# Patient Record
Sex: MALE | Race: WHITE | NOT HISPANIC OR LATINO | Employment: STUDENT
[De-identification: names, ages, dates, MRNs, and addresses within clinical notes are randomized per-mention and may not be internally consistent; named-entity substitution may affect disease eponyms.]

---

## 2017-02-24 ENCOUNTER — RECORDS - HEALTHEAST (OUTPATIENT)
Dept: ADMINISTRATIVE | Facility: OTHER | Age: 22
End: 2017-02-24

## 2017-05-31 ENCOUNTER — RECORDS - HEALTHEAST (OUTPATIENT)
Dept: ADMINISTRATIVE | Facility: OTHER | Age: 22
End: 2017-05-31

## 2017-12-04 ENCOUNTER — RECORDS - HEALTHEAST (OUTPATIENT)
Dept: ADMINISTRATIVE | Facility: OTHER | Age: 22
End: 2017-12-04

## 2017-12-05 ENCOUNTER — RECORDS - HEALTHEAST (OUTPATIENT)
Dept: ADMINISTRATIVE | Facility: OTHER | Age: 22
End: 2017-12-05

## 2017-12-12 ENCOUNTER — RECORDS - HEALTHEAST (OUTPATIENT)
Dept: ADMINISTRATIVE | Facility: OTHER | Age: 22
End: 2017-12-12

## 2017-12-29 ENCOUNTER — RECORDS - HEALTHEAST (OUTPATIENT)
Dept: ADMINISTRATIVE | Facility: OTHER | Age: 22
End: 2017-12-29

## 2018-01-31 ENCOUNTER — RECORDS - HEALTHEAST (OUTPATIENT)
Dept: ADMINISTRATIVE | Facility: OTHER | Age: 23
End: 2018-01-31

## 2018-02-01 ENCOUNTER — RECORDS - HEALTHEAST (OUTPATIENT)
Dept: ADMINISTRATIVE | Facility: OTHER | Age: 23
End: 2018-02-01

## 2018-08-07 ENCOUNTER — RECORDS - HEALTHEAST (OUTPATIENT)
Dept: ADMINISTRATIVE | Facility: OTHER | Age: 23
End: 2018-08-07

## 2018-08-23 ENCOUNTER — RECORDS - HEALTHEAST (OUTPATIENT)
Dept: ADMINISTRATIVE | Facility: OTHER | Age: 23
End: 2018-08-23

## 2018-11-09 ENCOUNTER — RECORDS - HEALTHEAST (OUTPATIENT)
Dept: ADMINISTRATIVE | Facility: OTHER | Age: 23
End: 2018-11-09

## 2019-01-17 ENCOUNTER — RECORDS - HEALTHEAST (OUTPATIENT)
Dept: ADMINISTRATIVE | Facility: OTHER | Age: 24
End: 2019-01-17

## 2019-04-16 ENCOUNTER — RECORDS - HEALTHEAST (OUTPATIENT)
Dept: ADMINISTRATIVE | Facility: OTHER | Age: 24
End: 2019-04-16

## 2019-04-30 ENCOUNTER — RECORDS - HEALTHEAST (OUTPATIENT)
Dept: ADMINISTRATIVE | Facility: OTHER | Age: 24
End: 2019-04-30

## 2019-06-04 ENCOUNTER — RECORDS - HEALTHEAST (OUTPATIENT)
Dept: ADMINISTRATIVE | Facility: OTHER | Age: 24
End: 2019-06-04

## 2019-07-18 ENCOUNTER — OFFICE VISIT - HEALTHEAST (OUTPATIENT)
Dept: FAMILY MEDICINE | Facility: CLINIC | Age: 24
End: 2019-07-18

## 2019-07-18 DIAGNOSIS — M25.562 LEFT KNEE PAIN, UNSPECIFIED CHRONICITY: ICD-10-CM

## 2019-07-18 DIAGNOSIS — F33.1 MODERATE EPISODE OF RECURRENT MAJOR DEPRESSIVE DISORDER (H): ICD-10-CM

## 2019-07-18 DIAGNOSIS — F90.0 ATTENTION DEFICIT HYPERACTIVITY DISORDER (ADHD), PREDOMINANTLY INATTENTIVE TYPE: ICD-10-CM

## 2019-07-18 DIAGNOSIS — F41.9 ANXIETY: ICD-10-CM

## 2019-07-18 ASSESSMENT — MIFFLIN-ST. JEOR: SCORE: 1622.91

## 2019-07-24 ENCOUNTER — RECORDS - HEALTHEAST (OUTPATIENT)
Dept: ADMINISTRATIVE | Facility: OTHER | Age: 24
End: 2019-07-24

## 2019-07-29 ENCOUNTER — COMMUNICATION - HEALTHEAST (OUTPATIENT)
Dept: SCHEDULING | Facility: CLINIC | Age: 24
End: 2019-07-29

## 2019-07-29 DIAGNOSIS — F90.0 ATTENTION DEFICIT HYPERACTIVITY DISORDER (ADHD), PREDOMINANTLY INATTENTIVE TYPE: ICD-10-CM

## 2019-08-08 ENCOUNTER — HOSPITAL ENCOUNTER (OUTPATIENT)
Dept: MRI IMAGING | Facility: HOSPITAL | Age: 24
Discharge: HOME | End: 2019-08-08
Attending: FAMILY MEDICINE

## 2019-08-08 DIAGNOSIS — M25.562 LEFT KNEE PAIN, UNSPECIFIED CHRONICITY: ICD-10-CM

## 2019-08-12 ENCOUNTER — OFFICE VISIT - HEALTHEAST (OUTPATIENT)
Dept: FAMILY MEDICINE | Facility: CLINIC | Age: 24
End: 2019-08-12

## 2019-08-12 DIAGNOSIS — M25.562 LEFT KNEE PAIN, UNSPECIFIED CHRONICITY: ICD-10-CM

## 2019-08-12 DIAGNOSIS — F90.0 ATTENTION DEFICIT HYPERACTIVITY DISORDER (ADHD), PREDOMINANTLY INATTENTIVE TYPE: ICD-10-CM

## 2019-08-12 DIAGNOSIS — F41.9 ANXIETY: ICD-10-CM

## 2019-08-12 DIAGNOSIS — F33.1 MODERATE EPISODE OF RECURRENT MAJOR DEPRESSIVE DISORDER (H): ICD-10-CM

## 2019-08-12 ASSESSMENT — MIFFLIN-ST. JEOR: SCORE: 1625.74

## 2019-08-30 ENCOUNTER — RECORDS - HEALTHEAST (OUTPATIENT)
Dept: ADMINISTRATIVE | Facility: OTHER | Age: 24
End: 2019-08-30

## 2019-09-24 ENCOUNTER — COMMUNICATION - HEALTHEAST (OUTPATIENT)
Dept: FAMILY MEDICINE | Facility: CLINIC | Age: 24
End: 2019-09-24

## 2019-09-24 DIAGNOSIS — F90.0 ATTENTION DEFICIT HYPERACTIVITY DISORDER (ADHD), PREDOMINANTLY INATTENTIVE TYPE: ICD-10-CM

## 2019-10-22 ENCOUNTER — OFFICE VISIT - HEALTHEAST (OUTPATIENT)
Dept: FAMILY MEDICINE | Facility: CLINIC | Age: 24
End: 2019-10-22

## 2019-10-22 DIAGNOSIS — G47.00 INSOMNIA, UNSPECIFIED TYPE: ICD-10-CM

## 2019-10-22 DIAGNOSIS — F33.1 MODERATE EPISODE OF RECURRENT MAJOR DEPRESSIVE DISORDER (H): ICD-10-CM

## 2019-10-22 DIAGNOSIS — F41.9 ANXIETY: ICD-10-CM

## 2019-10-22 DIAGNOSIS — F90.0 ATTENTION DEFICIT HYPERACTIVITY DISORDER (ADHD), PREDOMINANTLY INATTENTIVE TYPE: ICD-10-CM

## 2019-10-22 ASSESSMENT — MIFFLIN-ST. JEOR: SCORE: 1616.39

## 2019-10-23 ASSESSMENT — ANXIETY QUESTIONNAIRES
1. FEELING NERVOUS, ANXIOUS, OR ON EDGE: NEARLY EVERY DAY
6. BECOMING EASILY ANNOYED OR IRRITABLE: SEVERAL DAYS
4. TROUBLE RELAXING: MORE THAN HALF THE DAYS
GAD7 TOTAL SCORE: 13
3. WORRYING TOO MUCH ABOUT DIFFERENT THINGS: NEARLY EVERY DAY
7. FEELING AFRAID AS IF SOMETHING AWFUL MIGHT HAPPEN: NOT AT ALL
5. BEING SO RESTLESS THAT IT IS HARD TO SIT STILL: SEVERAL DAYS
2. NOT BEING ABLE TO STOP OR CONTROL WORRYING: NEARLY EVERY DAY

## 2019-10-23 ASSESSMENT — PATIENT HEALTH QUESTIONNAIRE - PHQ9: SUM OF ALL RESPONSES TO PHQ QUESTIONS 1-9: 14

## 2019-11-27 ENCOUNTER — OFFICE VISIT - HEALTHEAST (OUTPATIENT)
Dept: FAMILY MEDICINE | Facility: CLINIC | Age: 24
End: 2019-11-27

## 2019-11-27 DIAGNOSIS — F33.1 MODERATE EPISODE OF RECURRENT MAJOR DEPRESSIVE DISORDER (H): ICD-10-CM

## 2019-11-27 DIAGNOSIS — F90.0 ATTENTION DEFICIT HYPERACTIVITY DISORDER (ADHD), PREDOMINANTLY INATTENTIVE TYPE: ICD-10-CM

## 2019-11-27 DIAGNOSIS — F41.9 ANXIETY: ICD-10-CM

## 2019-11-27 DIAGNOSIS — F12.10 CANNABIS ABUSE: ICD-10-CM

## 2019-11-27 ASSESSMENT — MIFFLIN-ST. JEOR: SCORE: 1612.98

## 2019-12-23 ENCOUNTER — RECORDS - HEALTHEAST (OUTPATIENT)
Dept: ADMINISTRATIVE | Facility: OTHER | Age: 24
End: 2019-12-23

## 2019-12-26 ENCOUNTER — RECORDS - HEALTHEAST (OUTPATIENT)
Dept: FAMILY MEDICINE | Facility: CLINIC | Age: 24
End: 2019-12-26

## 2019-12-27 ENCOUNTER — AMBULATORY - HEALTHEAST (OUTPATIENT)
Dept: LAB | Facility: CLINIC | Age: 24
End: 2019-12-27

## 2019-12-27 DIAGNOSIS — Z79.899 ENCOUNTER FOR LONG-TERM (CURRENT) USE OF HIGH-RISK MEDICATION: ICD-10-CM

## 2019-12-27 LAB
AMPHETAMINES UR QL SCN: NORMAL
BARBITURATES UR QL: NORMAL
BENZODIAZ UR QL: NORMAL
CANNABINOIDS UR QL SCN: NORMAL
COCAINE UR QL: NORMAL
CREAT UR-MCNC: 10.4 MG/DL
OPIATES UR QL SCN: NORMAL
OXYCODONE UR QL: NORMAL
PCP UR QL SCN: NORMAL

## 2019-12-30 ENCOUNTER — OFFICE VISIT - HEALTHEAST (OUTPATIENT)
Dept: FAMILY MEDICINE | Facility: CLINIC | Age: 24
End: 2019-12-30

## 2019-12-30 DIAGNOSIS — J02.9 SORE THROAT: ICD-10-CM

## 2019-12-30 LAB — DEPRECATED S PYO AG THROAT QL EIA: NORMAL

## 2019-12-30 ASSESSMENT — MIFFLIN-ST. JEOR: SCORE: 1617.24

## 2019-12-31 LAB — GROUP A STREP BY PCR: NORMAL

## 2020-01-20 ENCOUNTER — RECORDS - HEALTHEAST (OUTPATIENT)
Dept: ADMINISTRATIVE | Facility: OTHER | Age: 25
End: 2020-01-20

## 2020-04-07 ENCOUNTER — RECORDS - HEALTHEAST (OUTPATIENT)
Dept: ADMINISTRATIVE | Facility: OTHER | Age: 25
End: 2020-04-07

## 2020-06-01 ENCOUNTER — RECORDS - HEALTHEAST (OUTPATIENT)
Dept: ADMINISTRATIVE | Facility: OTHER | Age: 25
End: 2020-06-01

## 2020-07-15 ENCOUNTER — RECORDS - HEALTHEAST (OUTPATIENT)
Dept: ADMINISTRATIVE | Facility: OTHER | Age: 25
End: 2020-07-15

## 2020-08-19 ENCOUNTER — RECORDS - HEALTHEAST (OUTPATIENT)
Dept: ADMINISTRATIVE | Facility: OTHER | Age: 25
End: 2020-08-19

## 2020-12-22 ENCOUNTER — OFFICE VISIT - HEALTHEAST (OUTPATIENT)
Dept: INTERNAL MEDICINE | Facility: CLINIC | Age: 25
End: 2020-12-22

## 2020-12-22 DIAGNOSIS — R04.0 RECURRENT EPISTAXIS: ICD-10-CM

## 2020-12-22 DIAGNOSIS — J30.9 CHRONIC ALLERGIC RHINITIS: ICD-10-CM

## 2020-12-30 ENCOUNTER — TELEPHONE (OUTPATIENT)
Dept: OTOLARYNGOLOGY | Facility: CLINIC | Age: 25
End: 2020-12-30

## 2020-12-30 ENCOUNTER — OFFICE VISIT (OUTPATIENT)
Dept: OTOLARYNGOLOGY | Facility: CLINIC | Age: 25
End: 2020-12-30
Payer: COMMERCIAL

## 2020-12-30 VITALS
BODY MASS INDEX: 19.88 KG/M2 | RESPIRATION RATE: 17 BRPM | HEART RATE: 69 BPM | DIASTOLIC BLOOD PRESSURE: 77 MMHG | SYSTOLIC BLOOD PRESSURE: 111 MMHG | OXYGEN SATURATION: 97 % | WEIGHT: 142 LBS | HEIGHT: 71 IN

## 2020-12-30 DIAGNOSIS — R04.0 EPISTAXIS: ICD-10-CM

## 2020-12-30 DIAGNOSIS — J34.3 NASAL TURBINATE HYPERTROPHY: ICD-10-CM

## 2020-12-30 DIAGNOSIS — J34.89 NASAL OBSTRUCTION: Primary | ICD-10-CM

## 2020-12-30 DIAGNOSIS — J34.2 NASAL SEPTAL DEVIATION: ICD-10-CM

## 2020-12-30 PROCEDURE — 99204 OFFICE O/P NEW MOD 45 MIN: CPT | Performed by: OTOLARYNGOLOGY

## 2020-12-30 RX ORDER — DEXTROAMPHETAMINE SACCHARATE, AMPHETAMINE ASPARTATE, DEXTROAMPHETAMINE SULFATE AND AMPHETAMINE SULFATE 2.5; 2.5; 2.5; 2.5 MG/1; MG/1; MG/1; MG/1
10 TABLET ORAL
COMMUNITY
End: 2023-02-27

## 2020-12-30 RX ORDER — FLUOXETINE 40 MG/1
40 CAPSULE ORAL DAILY
COMMUNITY
Start: 2020-07-14 | End: 2023-02-20

## 2020-12-30 ASSESSMENT — MIFFLIN-ST. JEOR: SCORE: 1653.3

## 2020-12-30 ASSESSMENT — PAIN SCALES - GENERAL: PAINLEVEL: NO PAIN (0)

## 2020-12-30 NOTE — LETTER
12/30/2020         RE: Boris Hartman  [address] [facility] [address]        Dear Colleague,    Thank you for referring your patient, Boris Hartman, to the [facility]. Please see a copy of my visit note below.    Chief Complaint - Epistaxis and nasal crusting    History of Present Illness - Boris Hartman is a 25 year old male presents with approximately a few months of epistaxis. It occurs on the right side. It started with some crusting, blowing out crusts, and then eventual bleeding. he has more nasal obstruction on the left. He admits to touching face a lot as he has ADHD. It usually only comes out the front of the nose. The bleeding occurs approximately every few weeks badly, but a few times a week more mild symptoms. The patient can get the bleeding to stop by pressure. The patient has never gone to the emergency department or required a blood transfusion due to nose bleeding. The patient has no personal or family history of bleeding disorders. The patient takes no blood-thinning medication. The patient has tried nothing.    Past Medical History -   - ADHD  - depression    Current Medications -   Current Outpatient Medications:      amphetamine-dextroamphetamine (ADDERALL) 10 MG tablet, Take 10 mg by mouth, Disp: , Rfl:      FLUoxetine (PROZAC) 40 MG capsule, Take 40 mg by mouth daily, Disp: , Rfl:     Allergies -   Allergies   Allergen Reactions     Sulfa Drugs Hives       Social History -   Social History     Socioeconomic History     Marital status: Single     Spouse name: None     Number of children: None     Years of education: None     Highest education level: None   Occupational History     None   Social Needs     Financial resource strain: None     Food insecurity     Worry: None     Inability: None     Transportation needs     Medical: None     Non-medical: None   Tobacco Use     Smoking status: Never Smoker     Smokeless tobacco: Current User     Types: Chew   Substance  "and Sexual Activity     Alcohol use: None     Drug use: None     Sexual activity: None   Lifestyle     Physical activity     Days per week: None     Minutes per session: None     Stress: None   Relationships     Social connections     Talks on phone: None     Gets together: None     Attends Moravian service: None     Active member of club or organization: None     Attends meetings of clubs or organizations: None     Relationship status: None     Intimate partner violence     Fear of current or ex partner: None     Emotionally abused: None     Physically abused: None     Forced sexual activity: None   Other Topics Concern     None   Social History Narrative     None       Family History - History reviewed. No pertinent family history. no bleeding disorders.     Review of Systems - As per HPI and PMHx, otherwise 10+ comprehensive system review is negative.    Physical Exam  /77   Pulse 69   Resp 17   Ht 1.791 m (5' 10.5\")   Wt 64.4 kg (142 lb)   SpO2 97%   BMI 20.09 kg/m    General - The patient is in no distress.  Alert and oriented x3, answers questions and cooperates with examination appropriately.   Voice and Breathing - The patient was breathing comfortably without the use of accessory muscles. There was no wheezing, stridor, or stertor.  The patients voice was clear and strong, with no dysphonia.  Head and Face - Normocephalic and atraumatic.    Eyes - Extraocular movements intact. Sclera were not icteric or injected, conjunctiva were pink and moist.  Neurologic - Cranial nerves II-XII are grossly intact. Specifically, the facial nerve is intact, House-Brackmann grade 1 of 6.   Nose - No significant external deformity. The septum was deviated severely to the left, completely obstructing, turbinates are hypertrophic.  Right mid septum has a small granuloma or hemangioma, 1 cm. No easy bleeding with manipulation.  Mouth - Examination of the oral cavity showed pink, healthy oral mucosa. No lesions or " ulcerations noted.  The tongue was mobile and protrudes midline.   Oropharynx - The walls of the oropharynx were smooth, symmetric, and had no lesions or ulcerations. The uvula was midline and the palate raised symmetrically. No blood.  Neck -  Soft, non-tender. Palpation of the occipital, submental, submandibular, internal jugular chain, and supraclavicular nodes did not demonstrate any abnormal lymph nodes or masses. The parotid glands were without masses. Palpation of the thyroid was soft and smooth, with no nodules or goiter appreciated.  The trachea was midline.  Cardiovascular - carotid pulses are 2+ bilaterally, regular rhythm        A/P - Boris Hartman is a 25 year old male with epistaxis. This is almost certainly coming from the right mid septum. He has a granuloma or hemangioma at this site. I offered removal, but he also has septal deviation and turbinate hypertrophy causing nasal obstruction. He prefers to undergo septoplasty and bilateral inferior turbinate reduction. Therefore, I will remove the right lesion at the time of surgery.     I counseled the patient on the risks of surgery, including infection, bleeding, risks of general anesthesia, the risk of failure of the surgery to relieve nasal obstruction, the possible need for additional procedures, the possible need for additional medical therapy, and the possibility of alteration of the appearance of the external nose.  They understood and wished to proceed to scheduling.      Trev Gu MD  Otolaryngology  [facility]        Again, thank you for allowing me to participate in the care of your patient.        Sincerely,        Trev Gu MD

## 2020-12-30 NOTE — PROGRESS NOTES
Chief Complaint - Epistaxis and nasal crusting    History of Present Illness - Boris Hartman is a 25 year old male presents with approximately a few months of epistaxis. It occurs on the right side. It started with some crusting, blowing out crusts, and then eventual bleeding. he has more nasal obstruction on the left. He admits to touching face a lot as he has ADHD. It usually only comes out the front of the nose. The bleeding occurs approximately every few weeks badly, but a few times a week more mild symptoms. The patient can get the bleeding to stop by pressure. The patient has never gone to the emergency department or required a blood transfusion due to nose bleeding. The patient has no personal or family history of bleeding disorders. The patient takes no blood-thinning medication. The patient has tried nothing.    Past Medical History -   - ADHD  - depression    Current Medications -   Current Outpatient Medications:      amphetamine-dextroamphetamine (ADDERALL) 10 MG tablet, Take 10 mg by mouth, Disp: , Rfl:      FLUoxetine (PROZAC) 40 MG capsule, Take 40 mg by mouth daily, Disp: , Rfl:     Allergies -   Allergies   Allergen Reactions     Sulfa Drugs Hives       Social History -   Social History     Socioeconomic History     Marital status: Single     Spouse name: None     Number of children: None     Years of education: None     Highest education level: None   Occupational History     None   Social Needs     Financial resource strain: None     Food insecurity     Worry: None     Inability: None     Transportation needs     Medical: None     Non-medical: None   Tobacco Use     Smoking status: Never Smoker     Smokeless tobacco: Current User     Types: Chew   Substance and Sexual Activity     Alcohol use: None     Drug use: None     Sexual activity: None   Lifestyle     Physical activity     Days per week: None     Minutes per session: None     Stress: None   Relationships     Social connections     Talks on  "phone: None     Gets together: None     Attends Judaism service: None     Active member of club or organization: None     Attends meetings of clubs or organizations: None     Relationship status: None     Intimate partner violence     Fear of current or ex partner: None     Emotionally abused: None     Physically abused: None     Forced sexual activity: None   Other Topics Concern     None   Social History Narrative     None       Family History - History reviewed. No pertinent family history. no bleeding disorders.     Review of Systems - As per HPI and PMHx, otherwise 10+ comprehensive system review is negative.    Physical Exam  /77   Pulse 69   Resp 17   Ht 1.791 m (5' 10.5\")   Wt 64.4 kg (142 lb)   SpO2 97%   BMI 20.09 kg/m    General - The patient is in no distress.  Alert and oriented x3, answers questions and cooperates with examination appropriately.   Voice and Breathing - The patient was breathing comfortably without the use of accessory muscles. There was no wheezing, stridor, or stertor.  The patients voice was clear and strong, with no dysphonia.  Head and Face - Normocephalic and atraumatic.    Eyes - Extraocular movements intact. Sclera were not icteric or injected, conjunctiva were pink and moist.  Neurologic - Cranial nerves II-XII are grossly intact. Specifically, the facial nerve is intact, House-Brackmann grade 1 of 6.   Nose - No significant external deformity. The septum was deviated severely to the left, completely obstructing, turbinates are hypertrophic.  Right mid septum has a small granuloma or hemangioma, 1 cm. No easy bleeding with manipulation.  Mouth - Examination of the oral cavity showed pink, healthy oral mucosa. No lesions or ulcerations noted.  The tongue was mobile and protrudes midline.   Oropharynx - The walls of the oropharynx were smooth, symmetric, and had no lesions or ulcerations. The uvula was midline and the palate raised symmetrically. No blood.  Neck -  " Soft, non-tender. Palpation of the occipital, submental, submandibular, internal jugular chain, and supraclavicular nodes did not demonstrate any abnormal lymph nodes or masses. The parotid glands were without masses. Palpation of the thyroid was soft and smooth, with no nodules or goiter appreciated.  The trachea was midline.  Cardiovascular - carotid pulses are 2+ bilaterally, regular rhythm        A/P - Boris Hartman is a 25 year old male with epistaxis. This is almost certainly coming from the right mid septum. He has a granuloma or hemangioma at this site. I offered removal, but he also has septal deviation and turbinate hypertrophy causing nasal obstruction. He prefers to undergo septoplasty and bilateral inferior turbinate reduction. Therefore, I will remove the right lesion at the time of surgery.     I counseled the patient on the risks of surgery, including infection, bleeding, risks of general anesthesia, the risk of failure of the surgery to relieve nasal obstruction, the possible need for additional procedures, the possible need for additional medical therapy, and the possibility of alteration of the appearance of the external nose.  They understood and wished to proceed to scheduling.      Trev Gu MD  Otolaryngology  [facility]

## 2021-01-06 ENCOUNTER — RECORDS - HEALTHEAST (OUTPATIENT)
Dept: ADMINISTRATIVE | Facility: OTHER | Age: 26
End: 2021-01-06

## 2021-05-12 ENCOUNTER — RECORDS - HEALTHEAST (OUTPATIENT)
Dept: ADMINISTRATIVE | Facility: OTHER | Age: 26
End: 2021-05-12

## 2021-05-26 ASSESSMENT — PATIENT HEALTH QUESTIONNAIRE - PHQ9: SUM OF ALL RESPONSES TO PHQ QUESTIONS 1-9: 14

## 2021-05-28 ASSESSMENT — ANXIETY QUESTIONNAIRES: GAD7 TOTAL SCORE: 13

## 2021-05-30 NOTE — PROGRESS NOTES
Assessment / Impression     1. Attention deficit hyperactivity disorder (ADHD), predominantly inattentive type  lisdexamfetamine (VYVANSE) 20 MG capsule   2. Anxiety     3. Moderate episode of recurrent major depressive disorder (H)     4. Left knee pain, unspecified chronicity  XR Knee Left Plus Sunrise VW    MR Knee Without Contrast Left       The following are part of a depression follow up plan for the patient:  mental health care assessment    Plan:     We discussed his symptoms which appear to be primarily due to worsening ADHD symptoms.  Since he has not noticed benefit from the Lexapro or Zoloft I recommended he stay off SSRIs for now.  We discussed treatment options to target the attention deficit issues more directly and decided to start Vyvanse 20 mg daily.  We discussed potential side effects which he is aware of he has been on methylphenidate in the past.  We will continue to monitor his anxiety depression symptoms.  I asked him to return to the clinic in 1 month for follow-up and we can make adjustments if needed time.  We also discussed strategies to help with staying organized at school.  He would particularly benefit from using a calendar to keep up with tasks, homework, meetings and class projects.  He is already getting accommodations through school so that he can have more time to take exams and complete projects.    I recommended that we check an x-ray of the left knee to further evaluate his pain symptoms.  I personally reviewed the images and provided an initial interpretation which show no acute fractures or abnormalities.  The formal radiology report is pending.  I recommended that we check an MRI to further evaluate the symptoms since he is majoring in dance.    Subjective:      HPI: Boris Hartman is a 24 y.o. male who presents to the clinic with his mother to tablets care and to discuss worsening attention deficit issues, anxiety and depression symptoms.  They report that he was formally  diagnosed with ADHD, primarily inattentive type in the second and third grade.  He did not start medication until he was in the eighth grade.  He was primarily taking methylphenidate through high school which worked well.  He was a successful student and primarily made A's and B's.  He reports that his anxiety depression symptoms were present at that time, but were not as overwhelming as they are now.  He is currently starting his fourth year college at [employer].  He anticipates needing 5 years to graduate.  When he was a freshman in college his anxiety spiked.  His PCP at that time decided to stop the methylphenidate thinking this was contributing to the increased anxiety started on Lexapro.  This provided some benefit regarding anxiety depression symptoms, but he ended up switching to Zoloft this past April because of the anxiety and depression symptoms were getting worse.  He reports that his schoolwork has suffered tremendously the past 2 years.  He describes having difficulty staying organized.  He is struggling to focus on schoolwork.  His motivation is down.  He is procrastinating and not getting work done on time.  His grades have suffered.  This is because increased anxiety and depression symptoms for him.  He reports that he is able to focus on things that interest him which he finds calming.  This is primarily media.    He is also concerned today about pain in the left knee that started last fall.  He does not remember specific injury that may have caused this.  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  He also reports that it is very important that he is knee remain healthy to keep up with the requirements of his major.  He denies knee swelling, catching or locking symptoms.  He denies having history of surgery or fractures that involve the left knee.  He reports that the knee feels on stable.  Symptoms are worse when the knee is fully flexed.        Review of  "Systems  All other systems reviewed and are negative.     Social History     Tobacco Use   Smoking Status Never Smoker   Smokeless Tobacco Never Used       No family history on file.    Objective:     /66 (Patient Site: Left Arm, Patient Position: Sitting, Cuff Size: Adult Regular)   Pulse (!) 56   Temp 98.7  F (37.1  C) (Oral)   Resp 16   Ht 5' 11.3\" (1.811 m)   Wt 132 lb 8 oz (60.1 kg)   BMI 18.32 kg/m    Physical Examination: General appearance - alert, well appearing, and in no distress  Eyes: pupils equal and reactive, extraocular eye movements intact  Mouth: mucous membranes moist, pharynx normal without lesions  Neck: supple, no significant adenopathy  Lungs: clear to auscultation, no wheezes, rales or rhonchi, symmetric air entry  Heart: normal rate, regular rhythm, normal S1, S2, no murmurs, rubs, clicks or gallops  Neurological: alert, oriented, normal speech, no focal findings or movement disorder noted.    Extremities: No edema, no clubbing or cyanosis.  Knee flexion and extension is normal bilaterally.  Full knee flexion on the left causes slight discomfort.  There are no effusions in the knees.  Arelis's, varus, valgus, Lachman's and drawer testing negative bilaterally.  Psychiatric: He appears moderately anxious during the exam.  He answers questions appropriately.  He does not appear depressed.    No results found for this or any previous visit (from the past 168 hour(s)).    Current Outpatient Medications   Medication Sig     lisdexamfetamine (VYVANSE) 20 MG capsule Take 1 capsule (20 mg total) by mouth daily.     "

## 2021-05-30 NOTE — TELEPHONE ENCOUNTER
I spoke to his mother.  He was not available because he was at work.  She reports that he has been feeling more anxious primarily because he is having difficulty with focus and organization.  I recommended increasing the Vyvanse to 40 mg daily to see if this helps.  It sounds like he has been sporadically taking the sertraline this past week.  He is going to hold this for now and see how things go on the higher dose of Vyvanse before we commit to restarting an SSRI.  He has a follow-up appointment with me on 8/12/2019. -DE

## 2021-05-31 NOTE — PROGRESS NOTES
Assessment / Impression     1. Attention deficit hyperactivity disorder (ADHD), predominantly inattentive type  lisdexamfetamine (VYVANSE) 40 MG capsule   2. Moderate episode of recurrent major depressive disorder (H)     3. Anxiety     4. Left knee pain, unspecified chronicity         The following are part of a depression follow up plan for the patient:  mental health care assessment    Plan:     He appears to be doing better on Vyvanse 40 mg daily.  I recommended he continue this.  He will continue to monitor symptoms closely as he is about to start his next year of college.  Asked him to follow-up with me over the Thanksgiving or winter break to see how things are going.    I reviewed with him the normal MRI results of his left knee.  His pain symptoms are likely to to an overuse injury, possibly IT band syndrome since his tenderness is more localized to the left lateral knee today.  We discussed relative rest and improving core muscle strength.  If symptoms continue he will let me know and we can pursue further work-up if needed.    Subjective:      HPI: Boris Hartman is a 24 y.o. male who presents to the clinic for follow-up.  He was last seen on 7/18/2019 for worsening ADHD symptoms.  He is also having quite a bit of anxiety and depression symptoms which were felt at the time to be due mostly to difficulty with focus and organization.  He took Vyvanse 20 mg daily and on 7/29/2019 we decided to increase the dose to 40 mg daily since he was having increased anxiety which was felt to be related to difficulty with focus and organization.  He feels like the higher dose has been helpful.  He denies having unwanted side effects.  Sleep been appetite have been fine.  He will be starting his next year of college at [employer] in a few weeks.  He continues to have some mild pain in the left knee.  He puts his hand over the lateral left knee when explaining with the symptoms are located.  Symptoms are worse with overuse  "and when the knee is fully flexed.  He recently had an MRI of the left knee which was normal.  He denies swelling.  He denies any specific injury.  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       Review of Systems  All other systems reviewed and are negative.     Social History     Tobacco Use   Smoking Status Never Smoker   Smokeless Tobacco Never Used       No family history on file.    Objective:     /60 (Patient Site: Right Arm, Patient Position: Sitting, Cuff Size: Adult Regular)   Pulse (!) 56   Temp 98.3  F (36.8  C) (Oral)   Resp 16   Ht 5' 11.3\" (1.811 m)   Wt 133 lb 2 oz (60.4 kg)   BMI 18.41 kg/m    Physical Examination: General appearance - alert, well appearing, and in no distress  Eyes: pupils equal and reactive, extraocular eye movements intact  Mouth: mucous membranes moist, pharynx normal without lesions  Neck: supple, no significant adenopathy  Lungs: clear to auscultation, no wheezes, rales or rhonchi, symmetric air entry  Heart: normal rate, regular rhythm, normal S1, S2, no murmurs, rubs, clicks or gallops  Neurological: alert, oriented, normal speech, no focal findings or movement disorder noted.    Extremities: No edema, no clubbing or cyanosis.  Effusions in the knee.  He is mildly tender over the left lateral knee in the area of the left iliotibial band.  Arelis's, varus, valgus, Lachman's and drawer testing negative bilaterally.  Internal and external hip rotation normal bilaterally.  Psychiatric: Normal affect. Does not appear anxious or depressed.    No results found for this or any previous visit (from the past 168 hour(s)).    Current Outpatient Medications   Medication Sig     lisdexamfetamine (VYVANSE) 40 MG capsule Take 1 capsule (40 mg total) by mouth daily.     "

## 2021-06-01 NOTE — TELEPHONE ENCOUNTER
Controlled Substance Refill Request  Medication:   Requested Prescriptions     Pending Prescriptions Disp Refills     lisdexamfetamine (VYVANSE) 40 MG capsule 30 capsule 0     Sig: Take 1 capsule (40 mg total) by mouth daily.     Date Last Fill: 8/12/19  Pharmacy: [id number]   Submit electronically to pharmacy  Controlled Substance Agreement on File:   Encounter-Level CSA Scan Date:    There are no encounter-level csa scan date.       Last office visit: Last office visit pertaining to requested medication was 8/12/19.

## 2021-06-01 NOTE — TELEPHONE ENCOUNTER
Patient Returning Call  Reason for call:  Return call  Information relayed to patient:  n/a  Patient has additional questions:  Yes  If YES, what are your questions/concerns:  Patient is returning a call. Please call the patient back.  Okay to leave a detailed message?: Yes   [phone]

## 2021-06-02 NOTE — PROGRESS NOTES
"Assessment / Impression     1. Attention deficit hyperactivity disorder (ADHD), predominantly inattentive type  lisdexamfetamine (VYVANSE) 50 MG capsule   2. Moderate episode of recurrent major depressive disorder (H)     3. Anxiety     4. Insomnia, unspecified type         The following are part of a depression follow up plan for the patient:  mental health care assessment    Plan:     We decided to increase the Vyvanse to 50 mg daily.  I encouraged him to work on getting more sleep.  We discussed sleep hygiene strategies and he may try over-the-counter melatonin as needed.  His anxiety and depression symptoms appear to be stable without medication.  He may follow-up in 6 months or sooner if needed.    Subjective:      HPI: Boris Hartman is a 24 y.o. male who has a history of ADHD inattentive type, depression, anxiety and insomnia presents to the clinic for follow-up.  He just finished [employer].  He feels like things been going much better this semester with focus and distractibility, but there is still room for improvement especially in terms of staying well organized.  He is wondering if a slight increase in the dose would be beneficial.  He denies having unwanted side effects from the medication.  However, he continues to struggle with having difficulty falling asleep.  This been a problem for him for years.  He typically does not fall asleep until 2 AM.  He still able to wake up for his morning classes.  He has never tried any medication for this.  At one point somebody prescribed trazodone, but he never tried it.        Review of Systems  All other systems reviewed and are negative.     Social History     Tobacco Use   Smoking Status Never Smoker   Smokeless Tobacco Never Used       No family history on file.    Objective:     /72 (Patient Site: Left Arm, Patient Position: Sitting, Cuff Size: Adult Regular)   Pulse 84   Temp 98.6  F (37  C) (Oral)   Resp 20   Ht 5' 11.3\" " (1.811 m)   Wt 131 lb 1 oz (59.4 kg)   BMI 18.13 kg/m    Physical Examination: General appearance - alert, well appearing, and in no distress  Eyes: pupils equal and reactive, extraocular eye movements intact  Mouth: mucous membranes moist, pharynx normal without lesions  Neck: supple, no significant adenopathy  Lungs: clear to auscultation, no wheezes, rales or rhonchi, symmetric air entry  Heart: normal rate, regular rhythm, normal S1, S2, no murmurs, rubs, clicks or gallops  Neurological: alert, oriented, normal speech, no focal findings or movement disorder noted.    Extremities: No edema, no clubbing or cyanosis  Psychiatric: Normal affect. Does not appear anxious or depressed.  PHQ-9 score is 14.  ADELINE-7 score is 13.    No results found for this or any previous visit (from the past 168 hour(s)).    Current Outpatient Medications   Medication Sig     lisdexamfetamine (VYVANSE) 50 MG capsule Take 1 capsule (50 mg total) by mouth daily.

## 2021-06-03 VITALS
SYSTOLIC BLOOD PRESSURE: 112 MMHG | WEIGHT: 131.25 LBS | HEART RATE: 80 BPM | TEMPERATURE: 97.8 F | RESPIRATION RATE: 16 BRPM | BODY MASS INDEX: 18.37 KG/M2 | DIASTOLIC BLOOD PRESSURE: 74 MMHG | HEIGHT: 71 IN

## 2021-06-03 VITALS
WEIGHT: 131.06 LBS | RESPIRATION RATE: 20 BRPM | DIASTOLIC BLOOD PRESSURE: 72 MMHG | HEART RATE: 84 BPM | TEMPERATURE: 98.6 F | SYSTOLIC BLOOD PRESSURE: 112 MMHG | BODY MASS INDEX: 18.35 KG/M2 | HEIGHT: 71 IN

## 2021-06-03 VITALS — WEIGHT: 133.13 LBS | HEIGHT: 71 IN | BODY MASS INDEX: 18.64 KG/M2

## 2021-06-03 VITALS — HEIGHT: 71 IN | WEIGHT: 132.5 LBS | BODY MASS INDEX: 18.55 KG/M2

## 2021-06-03 VITALS
SYSTOLIC BLOOD PRESSURE: 112 MMHG | WEIGHT: 130.31 LBS | RESPIRATION RATE: 16 BRPM | HEART RATE: 72 BPM | BODY MASS INDEX: 18.24 KG/M2 | DIASTOLIC BLOOD PRESSURE: 76 MMHG | HEIGHT: 71 IN

## 2021-06-03 NOTE — PROGRESS NOTES
Assessment / Impression     1. Moderate episode of recurrent major depressive disorder (H)  FLUoxetine (PROZAC) 20 MG capsule    Ambulatory referral to Psychiatry   2. Anxiety  Ambulatory referral to Psychiatry   3. Attention deficit hyperactivity disorder (ADHD), predominantly inattentive type  lisdexamfetamine (VYVANSE) 40 MG capsule   4. Cannabis abuse         The following are part of a depression follow up plan for the patient:  mental health care assessment    Plan:     I expressed my concern that his depression and anxiety symptoms have been getting worse.  I recommended he stop using THC/cannabis which are likely contributing to this.  He agrees with this plan.  He is planning to change rooms so he is not living with his current roommate who uses THC regularly.  I also recommended that he continue counseling and he was given a referral to establish care with psychiatry.  In the meantime we discussed treatment options and decided to start fluoxetine 20 mg daily.  I agreed to provide a prescription for Vyvanse today.  However, if he continues to use cannabis I will not be able to continue refilling this prescription.  He is interested in moving the dose back down to 40 mg daily since the 50 mg dose did not seem to help much more.    Subjective:      HPI: Boris Hartman is a 24 y.o. male who presents to the clinic for follow-up visit.  He has a medical history significant for ADHD, inattentive type, depression and anxiety.  He is currently a student at [employer].  He reports that the semester has gone okay for him.  He is doing better academically than he has in the past.  He has been taking Vyvanse which she finds helpful.  He is primarily concerned today about worsening depression and anxiety symptoms.  He admits that he has been vaping THC regularly over the past year.  He admits that he has been doing this to help with his anxiety depression symptoms.  He also reports that he is having a difficult  "time sleeping.  He feels like melatonin works if he decides to go to bed after taking it instead of staying up too late.  He is often not going to sleep until 130-2 AM.  He has tried Lexapro and Zoloft in the past which have not been very helpful.  His Zoloft dose was up to 150 mg last spring.  He admits that he was not taking this regularly.  His mom is present during the appointment and provides some of the history.  She states that she is been having to help him a lot this semester to keep his schedule organized.        Review of Systems  All other systems reviewed and are negative.     Social History     Tobacco Use   Smoking Status Never Smoker   Smokeless Tobacco Never Used       No family history on file.    Objective:     /76 (Patient Site: Left Arm, Patient Position: Sitting, Cuff Size: Adult Regular)   Pulse 72   Resp 16   Ht 5' 11.3\" (1.811 m)   Wt 130 lb 5 oz (59.1 kg)   BMI 18.02 kg/m    Physical Examination: General appearance - alert, well appearing, and in no distress  Eyes: pupils equal and reactive, extraocular eye movements intact  Mouth: mucous membranes moist, pharynx normal without lesions  Neck: supple, no significant adenopathy  Lungs: clear to auscultation, no wheezes, rales or rhonchi, symmetric air entry  Heart: normal rate, regular rhythm, normal S1, S2, no murmurs, rubs, clicks or gallops  Neurological: alert, oriented, normal speech, no focal findings or movement disorder noted.    Extremities: No edema, no clubbing or cyanosis  Psychiatric: Appears moderately anxious and depressed.  He answers questions appropriately.    No results found for this or any previous visit (from the past 168 hour(s)).    Current Outpatient Medications   Medication Sig     FLUoxetine (PROZAC) 20 MG capsule Take 1 capsule (20 mg total) by mouth daily.     lisdexamfetamine (VYVANSE) 40 MG capsule Take 1 capsule (40 mg total) by mouth daily.     "

## 2021-06-04 NOTE — PROGRESS NOTES
"Assessment / Impression     1. Sore throat  Rapid Strep A Screen- Throat Swab    Group A Strep, RNA Direct Detection, Throat         Plan:     We checked a rapid strep test which was negative.  His symptoms are most likely viral.  I recommended symptomatic cares if his symptoms do not improve over the next 1-2 weeks he will return to the clinic.  The strep swab will be sent for culture.    Subjective:      HPI: Boris Hartman is a 24 y.o. male who presents the clinic to be evaluated for sore throat that he has had for the past 1.5 weeks.  He is also noticed a swollen lymph node on the left and a smaller one on the right.  He describes having head and throat congestion symptoms.  He denies chest congestion, fevers or shortness of breath symptoms.  He is taking ibuprofen which was helpful.      Review of Systems  Pertinent items are noted in HPI.       Objective:     /74 (Patient Site: Right Arm, Patient Position: Sitting, Cuff Size: Adult Regular)   Pulse 80   Temp 97.8  F (36.6  C) (Oral)   Resp 16   Ht 5' 11.3\" (1.811 m)   Wt 131 lb 4 oz (59.5 kg)   BMI 18.15 kg/m      General Appearance: Alert, cooperative, appears slightly fatigued.  Head: Normocephalic, without obvious abnormality, atraumatic.  Eyes: PERRL, conjunctiva/corneas clear, EOM's intact.  Ears: Normal TM's and external ear canals, both ears.  Nose: Nares congested.  Throat: Erythematous. No exudates.  Neck: Supple, symmetrical, trachea midline, no lymphadenopathy.  Lungs: Clear to auscultation bilaterally, respirations unlabored.  Heart:: Regular rate and rhythm.  Extremities: Extremities normal, atraumatic, no cyanosis or edema.        Recent Results (from the past 168 hour(s))   Drugs of Abuse 1,Urine   Result Value Ref Range    Amphetamines Screen Negative Screen Negative    Benzodiazepines Screen Negative Screen Negative    Opiates Screen Negative Screen Negative    Phencyclidine Screen Negative Screen Negative    THC Screen Negative " Screen Negative    Barbiturates Screen Negative Screen Negative    Cocaine Metabolite Screen Negative Screen Negative    Oxycodone Screen Negative Screen Negative    Creatinine, Urine 10.4 mg/dL   Rapid Strep A Screen- Throat Swab   Result Value Ref Range    Rapid Strep A Antigen No Group A Strep detected, presumptive negative No Group A Strep detected, presumptive negative

## 2021-06-05 VITALS — SYSTOLIC BLOOD PRESSURE: 94 MMHG | BODY MASS INDEX: 19.22 KG/M2 | WEIGHT: 139 LBS | DIASTOLIC BLOOD PRESSURE: 60 MMHG

## 2021-06-14 NOTE — PROGRESS NOTES
Internal Medicine Office Visit  [facility]   Patient Name: Boris Hartman  Patient Age: 25 y.o.  YOB: 1995  MRN: [id number]    Date of Visit: 12/22/2020  Reason for Office Visit:   Chief Complaint   Patient presents with     Nose Concern           Assessment / Plan / Medical Decision Making:      Recurrent epistaxis  Chronic allergic rhinitis   - Ambulatory referral to ENT         Health Maintenance Review  Health Maintenance   Topic Date Due     DEPRESSION ACTION PLAN  1995     HEPATITIS C SCREENING  1995     PREVENTIVE CARE VISIT  1995     HPV VACCINES (1 - Male 2-dose series) 06/10/2008     HIV SCREENING  06/10/2012     TD 18+ HE  05/28/2020     INFLUENZA VACCINE RULE BASED (1) 08/01/2020     ADVANCE CARE PLANNING  07/18/2024     HEPATITIS B VACCINES  Completed     TDAP ADULT ONE TIME DOSE  Completed     Pneumococcal Vaccine: Pediatrics (0 to 5 Years) and At-Risk Patients (6 to 64 Years)  Aged Out         I have discontinued Boris Hartman's lisdexamfetamine. I am also having him maintain his FLUoxetine and dextroamphetamine-amphetamine.        Orders Placed This Encounter   Procedures     Ambulatory referral to ENT   Followup: Return if symptoms worsen or fail to improve. earlier if needed.        Gill Snider, CNP        HPI:  Boris Hartman is a 25 y.o. year old who presents to the office today for evaluation of recu epistaxis of the right naris.  Symptoms began approximately 3 or 4 months ago.  He recently felt something in the right naris and removed this, he was uncertain what it was but it seemed or looked like it was skin or cartilage.  This provoked some heavy bleeding.  He has a longstanding history of year-round allergies which worsen with snow melts and when he is around [employer] trees.  He has a difficult time breathing in through the left naris, has never had this evaluated in the past. 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         Review of Systems- pertinent positive in bold:  As in HPI       Current Scheduled Meds:  Outpatient Encounter Medications as of 12/22/2020   Medication Sig Dispense Refill     dextroamphetamine-amphetamine (ADDERALL) 10 mg Tab tablet Take 10 mg by mouth daily.       FLUoxetine (PROZAC) 20 MG capsule Take 1 capsule (20 mg total) by mouth daily. 90 capsule 1     [DISCONTINUED] lisdexamfetamine (VYVANSE) 40 MG capsule Take 1 capsule (40 mg total) by mouth daily. 30 capsule 0     No facility-administered encounter medications on file as of 12/22/2020.          History reviewed. No pertinent past medical history.  History reviewed. No pertinent surgical history.  Social History     Tobacco Use     Smoking status: Never Smoker     Smokeless tobacco: Never Used   Substance Use Topics     Alcohol use: Yes     Drug use: Not Currently     Types: Marijuana     Comment: Mid May 2019.       Objective / Physical Examination:  Vitals:    12/22/20 1050   BP: 94/60   Weight: 139 lb (63 kg)     Wt Readings from Last 3 Encounters:   12/22/20 139 lb (63 kg)   12/30/19 131 lb 4 oz (59.5 kg)   11/27/19 130 lb 5 oz (59.1 kg)     Body mass index is 19.22 kg/m .     Constitutional: In no apparent distress  ENT: Tympanic membrane clear with landmarks well visualized bilaterally. Septum deviated, left nares mildly obstructed. Right nares with boggy and pale inferior turbinate. Unable to visualize beyond this point. No visible polyps  .

## 2021-06-19 NOTE — LETTER
Letter by Anthony Evangelista DO at      Author: Anthony Evangelista DO Service: -- Author Type: --    Filed:  Encounter Date: 10/22/2019 Status: Signed         [facility]  10/22/19    Patient: Boris Hartman  YOB: 1995  Medical Record Number: [id number]  CSN: [id number]                                                                              Non-opioid Controlled Substance Agreement    I understand that my care provider has prescribed a controlled substance to help manage my condition(s). I am taking this medicine to help me function or work. I know this is strong medicine, and that it can cause serious side effects. Controlled substances can be sedating, addicting and may cause a dependency on the drug. They can affect my ability to drive or think, and cause depression. They need to be taken exactly as prescribed. Combining controlled substances with certain medicines or chemicals (such as cocaine, sedatives and tranquilizers, sleeping pills, meth) can be dangerous or even fatal. Also, if I stop controlled substances suddenly, I may have severe withdrawal symptoms.  If not helpful, I may be asked to stop them.    The risks, benefits, and side effects of these medicine(s) were explained to me. I agree that:    1. I will take part in other treatments as advised by my care team. This may be psychiatry or counseling, physical therapy, behavioral therapy, group treatment or a referral to a pain clinic. I will reduce or stop my medicine when my care team tells me to do so.  2. I will take my medicines as prescribed. I will not change the dose or schedule unless my care team tells me to. There will be no refills if I run out early.  I may be contactedwithout warning and asked to complete a urine drug test or pill count at any time.   3. I will keep all my appointments, and understand this is part of the monitoring of controlled substances. My care team may require  an office visit for EVERY controlled substance refill. If I miss appointments or dont follow instructions, my care team may stop my medicine.  4. I will not ask other providers to prescribe controlled substances, and I will not accept controlled substances from other people. If I need another prescribed controlled substance for a new reason, I will tell my care team within 1 business day.  5. I will use one pharmacy to fill all of my controlled substance prescriptions, and it is up to me to make sure that I do not run out of my medicines on weekends or holidays. If my care team is willing to refill my controlled substance prescription without a visit, I must request refills only during office hours, refills may take up to 3 days to process, and it may take up to 5 to 7 days for my medicine to be mailed and ready at my pharmacy. Prescriptions will not be mailed anywhere except my pharmacy.    6. I am responsible for my prescriptions. If the medicine/prescription is lost or stolen, it will not be replaced. I also agree not to share controlled substance medicines with anyone.          [facility]  10/22/19  Patient:  Boris Hartman  YOB: 1995  Medical Record Number: [id number]  CSN: [id number]    7. I agree to not use ANY illegal or recreational drugs. This includes marijuana, cocaine, bath salts or other drugs. I agree not to use alcohol unless my care team says I may. I agree to give urine samples whenever asked. If I dont give a urine sample, the care team may stop my medicine.    8. If I enroll in the [place] Medical Marijuana program, I will tell my care team. I will also sign an agreement to share my medical records with my care team.    9. I will bring in my list of medicines (or my medicine bottles) each time I come to the clinic.   10. I will tell my care team right away if I become pregnant or have a new medical problem treated outside of my regular clinic.  11. I understand  that this medicine can affect my thinking and judgment. It may be unsafe for me to drive, use machinery and do dangerous tasks. I will not do any of these things until I know how the medicine affects me. If my dose changes, I will wait to see how it affects me. I will contact my care team if I have concerns about medicine side effects.    I understand that if I do not follow any of the conditions above, my prescriptions or treatment may be stopped.      I agree that my provider, clinic care team, and pharmacy may work with any city, state or federal law enforcement agency that investigates the misuse, sale, or other diversion of my controlled medicine. I will allow my provider to discuss my care with or share a copy of this agreement with any other treating provider, pharmacy or emergency room where I receive care. I agree to give up (waive) any right of privacy or confidentiality with respect to these consents.   I have read this agreement and have asked questions about anything I did not understand.    ___________________________________________________________________________  Patient signature - Date/Time  -Boris Hartman                                      ___________________________________________________________________________  Witness signature                                                                    ___________________________________________________________________________  Provider signature- Anthony Meng,

## 2021-06-19 NOTE — LETTER
Letter by Anthony Evangelista DO at      Author: Anthony Evangelista DO Service: -- Author Type: --    Filed:  Encounter Date: 8/12/2019 Status: (Other)         [facility]  08/12/19    Patient: Boris Hartman  YOB: 1995  Medical Record Number: [id number]  CSN: [id number]                                                                              Non-opioid Controlled Substance Agreement    I understand that my care provider has prescribed a controlled substance to help manage my condition(s). I am taking this medicine to help me function or work. I know this is strong medicine, and that it can cause serious side effects. Controlled substances can be sedating, addicting and may cause a dependency on the drug. They can affect my ability to drive or think, and cause depression. They need to be taken exactly as prescribed. Combining controlled substances with certain medicines or chemicals (such as cocaine, sedatives and tranquilizers, sleeping pills, meth) can be dangerous or even fatal. Also, if I stop controlled substances suddenly, I may have severe withdrawal symptoms.  If not helpful, I may be asked to stop them.    The risks, benefits, and side effects of these medicine(s) were explained to me. I agree that:    1. I will take part in other treatments as advised by my care team. This may be psychiatry or counseling, physical therapy, behavioral therapy, group treatment or a referral to a pain clinic. I will reduce or stop my medicine when my care team tells me to do so.  2. I will take my medicines as prescribed. I will not change the dose or schedule unless my care team tells me to. There will be no refills if I run out early.  I may be contactedwithout warning and asked to complete a urine drug test or pill count at any time.   3. I will keep all my appointments, and understand this is part of the monitoring of controlled substances. My care team may require  an office visit for EVERY controlled substance refill. If I miss appointments or dont follow instructions, my care team may stop my medicine.  4. I will not ask other providers to prescribe controlled substances, and I will not accept controlled substances from other people. If I need another prescribed controlled substance for a new reason, I will tell my care team within 1 business day.  5. I will use one pharmacy to fill all of my controlled substance prescriptions, and it is up to me to make sure that I do not run out of my medicines on weekends or holidays. If my care team is willing to refill my controlled substance prescription without a visit, I must request refills only during office hours, refills may take up to 3 days to process, and it may take up to 5 to 7 days for my medicine to be mailed and ready at my pharmacy. Prescriptions will not be mailed anywhere except my pharmacy.    6. I am responsible for my prescriptions. If the medicine/prescription is lost or stolen, it will not be replaced. I also agree not to share controlled substance medicines with anyone.          [facility]  08/12/19  Patient:  Boris Hartman  YOB: 1995  Medical Record Number: [id number]  CSN: [id number]    7. I agree to not use ANY illegal or recreational drugs. This includes marijuana, cocaine, bath salts or other drugs. I agree not to use alcohol unless my care team says I may. I agree to give urine samples whenever asked. If I dont give a urine sample, the care team may stop my medicine.    8. If I enroll in the [place] Medical Marijuana program, I will tell my care team. I will also sign an agreement to share my medical records with my care team.    9. I will bring in my list of medicines (or my medicine bottles) each time I come to the clinic.   10. I will tell my care team right away if I become pregnant or have a new medical problem treated outside of my regular clinic.  11. I understand  that this medicine can affect my thinking and judgment. It may be unsafe for me to drive, use machinery and do dangerous tasks. I will not do any of these things until I know how the medicine affects me. If my dose changes, I will wait to see how it affects me. I will contact my care team if I have concerns about medicine side effects.    I understand that if I do not follow any of the conditions above, my prescriptions or treatment may be stopped.      I agree that my provider, clinic care team, and pharmacy may work with any city, state or federal law enforcement agency that investigates the misuse, sale, or other diversion of my controlled medicine. I will allow my provider to discuss my care with or share a copy of this agreement with any other treating provider, pharmacy or emergency room where I receive care. I agree to give up (waive) any right of privacy or confidentiality with respect to these consents.   I have read this agreement and have asked questions about anything I did not understand.    ___________________________________________________________________________  Patient signature - Date/Time  -Boris Hartman                                      ___________________________________________________________________________  Witness signature                                                                    ___________________________________________________________________________  Provider signature- Anthony Meng,

## 2021-08-15 ENCOUNTER — HEALTH MAINTENANCE LETTER (OUTPATIENT)
Age: 26
End: 2021-08-15

## 2021-10-11 ENCOUNTER — HEALTH MAINTENANCE LETTER (OUTPATIENT)
Age: 26
End: 2021-10-11

## 2021-11-17 LAB — PHQ9 SCORE: 14

## 2021-11-18 ENCOUNTER — TRANSFERRED RECORDS (OUTPATIENT)
Dept: HEALTH INFORMATION MANAGEMENT | Facility: CLINIC | Age: 26
End: 2021-11-18
Payer: COMMERCIAL

## 2022-01-12 LAB — PHQ9 SCORE: 17

## 2022-01-19 LAB — PHQ9 SCORE: 14

## 2022-01-26 LAB — PHQ9 SCORE: 10

## 2022-02-23 LAB — PHQ9 SCORE: 11

## 2022-03-25 LAB — PHQ9 SCORE: 15

## 2022-08-10 ENCOUNTER — TRANSFERRED RECORDS (OUTPATIENT)
Dept: HEALTH INFORMATION MANAGEMENT | Facility: CLINIC | Age: 27
End: 2022-08-10

## 2022-09-24 ENCOUNTER — HEALTH MAINTENANCE LETTER (OUTPATIENT)
Age: 27
End: 2022-09-24

## 2022-10-17 ENCOUNTER — OFFICE VISIT (OUTPATIENT)
Dept: INTERNAL MEDICINE | Facility: CLINIC | Age: 27
End: 2022-10-17
Payer: COMMERCIAL

## 2022-10-17 VITALS
WEIGHT: 136.4 LBS | HEIGHT: 71 IN | BODY MASS INDEX: 19.1 KG/M2 | TEMPERATURE: 98.9 F | SYSTOLIC BLOOD PRESSURE: 120 MMHG | DIASTOLIC BLOOD PRESSURE: 74 MMHG | OXYGEN SATURATION: 97 % | RESPIRATION RATE: 20 BRPM | HEART RATE: 72 BPM

## 2022-10-17 DIAGNOSIS — M77.42 METATARSALGIA OF LEFT FOOT: Primary | ICD-10-CM

## 2022-10-17 PROCEDURE — 99213 OFFICE O/P EST LOW 20 MIN: CPT | Mod: 25 | Performed by: NURSE PRACTITIONER

## 2022-10-17 PROCEDURE — 90471 IMMUNIZATION ADMIN: CPT | Performed by: NURSE PRACTITIONER

## 2022-10-17 PROCEDURE — 90472 IMMUNIZATION ADMIN EACH ADD: CPT | Performed by: NURSE PRACTITIONER

## 2022-10-17 PROCEDURE — 90715 TDAP VACCINE 7 YRS/> IM: CPT | Performed by: NURSE PRACTITIONER

## 2022-10-17 PROCEDURE — 90686 IIV4 VACC NO PRSV 0.5 ML IM: CPT | Performed by: NURSE PRACTITIONER

## 2022-10-17 RX ORDER — BUPROPION HYDROCHLORIDE 150 MG/1
150 TABLET ORAL DAILY
COMMUNITY
Start: 2022-08-24 | End: 2023-02-20

## 2022-10-17 ASSESSMENT — PATIENT HEALTH QUESTIONNAIRE - PHQ9
SUM OF ALL RESPONSES TO PHQ QUESTIONS 1-9: 10
SUM OF ALL RESPONSES TO PHQ QUESTIONS 1-9: 10
10. IF YOU CHECKED OFF ANY PROBLEMS, HOW DIFFICULT HAVE THESE PROBLEMS MADE IT FOR YOU TO DO YOUR WORK, TAKE CARE OF THINGS AT HOME, OR GET ALONG WITH OTHER PEOPLE: SOMEWHAT DIFFICULT

## 2022-10-19 ENCOUNTER — TELEPHONE (OUTPATIENT)
Dept: INTERNAL MEDICINE | Facility: CLINIC | Age: 27
End: 2022-10-19

## 2022-10-19 NOTE — TELEPHONE ENCOUNTER
Call pt- I didn't realize during his visit that he had filled out a PHQ9 for depression. Does he wish to discuss this further? I can do a VV with him or he could be scheduled with his PCP. I apologize for this oversight.         PHQ 10/23/2019 10/17/2022   PHQ-9 Total Score 14 10   Q9: Thoughts of better off dead/self-harm past 2 weeks Not at all Not at all

## 2022-11-02 NOTE — TELEPHONE ENCOUNTER
Left message to return call to clinic regarding questionnaires he filled out during visit with Gill Snider.  Also that if he would like to further discuss to return call to clinic.

## 2022-12-04 NOTE — PROGRESS NOTES
Chief Complaint - Nasal obstruction    History of Present Illness - Boris Hartman is a 27 year old male who presents for evaluation of nasal obstruction. The patient describes symptoms of nasal obstruction for the past few years. The patient notes symptoms most predominately on the left side. The patient notes some seasonal allergies, but at times allergies are okay, but he still has nasal obstruction. Treatments have included nasal steroids and oral antihistamines. The treatments seem to not help. No history of nasal trauma. No prior history of nasal surgery. his epistaxis hasn't been as bad. I saw him 2 years ago. He had epistaxis then. He had a right midseptal granuloma or hemangioma. I offered removal and septoplasty for his deviated septum. He did not schedule.     Past Medical History -   Patient Active Problem List   Diagnosis     Nasal obstruction     Nasal septal deviation     Nasal turbinate hypertrophy     Epistaxis       Current Medications -   Current Outpatient Medications:      amphetamine-dextroamphetamine (ADDERALL) 10 MG tablet, Take 10 mg by mouth (Patient not taking: Reported on 10/17/2022), Disp: , Rfl:      buPROPion (WELLBUTRIN XL) 150 MG 24 hr tablet, Take 150 mg by mouth daily, Disp: , Rfl:      FLUoxetine (PROZAC) 40 MG capsule, Take 40 mg by mouth daily (Patient not taking: Reported on 10/17/2022), Disp: , Rfl:     Allergies -   Allergies   Allergen Reactions     Sulfa Drugs Hives       Social History -   Social History     Socioeconomic History     Marital status: Single   Tobacco Use     Smoking status: Never     Smokeless tobacco: Never   Vaping Use     Vaping Use: Every day     Substances: delta-8       Physical Exam  General - The patient is in no distress. Alert and oriented to person and place, answers questions and cooperates with examination appropriately.   Neurologic - CN II-XII are grossly intact. No focal neurologic deficits.   Voice and Breathing - The patient was breathing  comfortably without the use of accessory muscles. There was no wheezing, stridor, or stertor.  The patients voice was clear and strong.  Eyes - Extraocular movements intact. Sclera were not icteric or injected, conjunctiva were pink and moist.  Mouth - Examination of the oral cavity showed pink, healthy oral mucosa. No lesions or ulcerations noted.  The tongue was mobile and midline, and the dentition were in good condition.    Throat - The walls of the oropharynx were smooth, pink, moist, symmetric, and had no lesions or ulcerations.  The tonsillar pillars and soft palate were symmetric.  The uvula was midline on elevation.  Neck -  Soft, non-tender. Palpation of the occipital, submental, submandibular, internal jugular chain, and supraclavicular nodes did not demonstrate any abnormal lymph nodes or masses. No parotid masses. Palpation of the thyroid was soft and smooth, with no nodules or goiter appreciated.  The trachea was mobile and midline.  Nose - External contour is symmetric, no gross deflection or scars.  Nasal mucosa is pink and moist with clear mucus. However the turbinates are hypertrophic. The septum was severely deviated to the left and completely obstructive.  No polyps, masses, or purulence noted on examination.      A/P - Boris Hartman is a 27 year old male with nasal obstruction due to septal deviation and inferior turbinate hypertrophy.  He has tried and failed medical treatment.  His septum is so severely deviated I would not expect medical treatment to help.  Therefore I recommend septoplasty with inferior turbinate reduction and out-fracture.    I counseled the patient on the risks of surgery, including infection, bleeding, risks of general anesthesia, the risk of failure of the surgery to relieve nasal obstruction, the possible need for additional procedures, the possible need for additional medical therapy, and the possibility of alteration of the appearance of the external nose.  They  understood and wished to proceed to scheduling.        Trev Gu MD  Otolaryngology  [facility]

## 2022-12-06 ENCOUNTER — OFFICE VISIT (OUTPATIENT)
Dept: OTOLARYNGOLOGY | Facility: CLINIC | Age: 27
End: 2022-12-06
Payer: COMMERCIAL

## 2022-12-06 VITALS
HEART RATE: 89 BPM | RESPIRATION RATE: 16 BRPM | SYSTOLIC BLOOD PRESSURE: 136 MMHG | OXYGEN SATURATION: 99 % | DIASTOLIC BLOOD PRESSURE: 90 MMHG

## 2022-12-06 DIAGNOSIS — J34.2 NASAL SEPTAL DEVIATION: ICD-10-CM

## 2022-12-06 DIAGNOSIS — J34.3 NASAL TURBINATE HYPERTROPHY: ICD-10-CM

## 2022-12-06 DIAGNOSIS — J34.89 NASAL OBSTRUCTION: Primary | ICD-10-CM

## 2022-12-06 PROCEDURE — 99214 OFFICE O/P EST MOD 30 MIN: CPT | Performed by: OTOLARYNGOLOGY

## 2022-12-06 NOTE — LETTER
12/6/2022         RE: Boris Hartman  [address] [id number]        Dear Colleague,    Thank you for referring your patient, Boris Hartman, to the [facility]. Please see a copy of my visit note below.    Chief Complaint - Nasal obstruction    History of Present Illness - Boris Hartman is a 27 year old male who presents for evaluation of nasal obstruction. The patient describes symptoms of nasal obstruction for the past few years. The patient notes symptoms most predominately on the left side. The patient notes some seasonal allergies, but at times allergies are okay, but he still has nasal obstruction. Treatments have included nasal steroids and oral antihistamines. The treatments seem to not help. No history of nasal trauma. No prior history of nasal surgery. his epistaxis hasn't been as bad. I saw him 2 years ago. He had epistaxis then. He had a right midseptal granuloma or hemangioma. I offered removal and septoplasty for his deviated septum. He did not schedule.     Past Medical History -   Patient Active Problem List   Diagnosis     Nasal obstruction     Nasal septal deviation     Nasal turbinate hypertrophy     Epistaxis       Current Medications -   Current Outpatient Medications:      amphetamine-dextroamphetamine (ADDERALL) 10 MG tablet, Take 10 mg by mouth (Patient not taking: Reported on 10/17/2022), Disp: , Rfl:      buPROPion (WELLBUTRIN XL) 150 MG 24 hr tablet, Take 150 mg by mouth daily, Disp: , Rfl:      FLUoxetine (PROZAC) 40 MG capsule, Take 40 mg by mouth daily (Patient not taking: Reported on 10/17/2022), Disp: , Rfl:     Allergies -   Allergies   Allergen Reactions     Sulfa Drugs Hives       Social History -   Social History     Socioeconomic History     Marital status: Single   Tobacco Use     Smoking status: Never     Smokeless tobacco: Never   Vaping Use     Vaping Use: Every day     Substances: delta-8       Physical Exam  General - The patient is in  no distress. Alert and oriented to person and place, answers questions and cooperates with examination appropriately.   Neurologic - CN II-XII are grossly intact. No focal neurologic deficits.   Voice and Breathing - The patient was breathing comfortably without the use of accessory muscles. There was no wheezing, stridor, or stertor.  The patients voice was clear and strong.  Eyes - Extraocular movements intact. Sclera were not icteric or injected, conjunctiva were pink and moist.  Mouth - Examination of the oral cavity showed pink, healthy oral mucosa. No lesions or ulcerations noted.  The tongue was mobile and midline, and the dentition were in good condition.    Throat - The walls of the oropharynx were smooth, pink, moist, symmetric, and had no lesions or ulcerations.  The tonsillar pillars and soft palate were symmetric.  The uvula was midline on elevation.  Neck -  Soft, non-tender. Palpation of the occipital, submental, submandibular, internal jugular chain, and supraclavicular nodes did not demonstrate any abnormal lymph nodes or masses. No parotid masses. Palpation of the thyroid was soft and smooth, with no nodules or goiter appreciated.  The trachea was mobile and midline.  Nose - External contour is symmetric, no gross deflection or scars.  Nasal mucosa is pink and moist with clear mucus. However the turbinates are hypertrophic. The septum was severely deviated to the left and completely obstructive.  No polyps, masses, or purulence noted on examination.      A/P - Boris Hartman is a 27 year old male with nasal obstruction due to septal deviation and inferior turbinate hypertrophy.  He has tried and failed medical treatment.  His septum is so severely deviated I would not expect medical treatment to help.  Therefore I recommend septoplasty with inferior turbinate reduction and out-fracture.    I counseled the patient on the risks of surgery, including infection, bleeding, risks of general anesthesia,  the risk of failure of the surgery to relieve nasal obstruction, the possible need for additional procedures, the possible need for additional medical therapy, and the possibility of alteration of the appearance of the external nose.  They understood and wished to proceed to scheduling.        Trev Gu MD  Otolaryngology  [facility]        Again, thank you for allowing me to participate in the care of your patient.        Sincerely,        Trev Gu MD

## 2022-12-07 ENCOUNTER — TELEPHONE (OUTPATIENT)
Dept: OTOLARYNGOLOGY | Facility: CLINIC | Age: 27
End: 2022-12-07

## 2022-12-13 PROBLEM — J34.2 NASAL SEPTAL DEVIATION: Status: ACTIVE | Noted: 2020-12-30

## 2022-12-13 PROBLEM — J34.3 NASAL TURBINATE HYPERTROPHY: Status: ACTIVE | Noted: 2020-12-30

## 2022-12-13 PROBLEM — J34.89 NASAL OBSTRUCTION: Status: ACTIVE | Noted: 2020-12-30

## 2022-12-13 NOTE — TELEPHONE ENCOUNTER
Type of surgery:   SEPTOPLASTY; BILATERAL INFERIOR TURBINATE REDUCTION (Bilateral)     CPT 34587,34891      Nasal obstruction J34.89     Nasal turbinate hypertrophy J34.3     Nasal septal deviation J34.2    Location of surgery: [facility]  Date and time of surgery: 02/27/2023  Surgeon: MORA  Pre-Op Appt Date: 02/20/23  Post-Op Appt Date: 03/07/2023   Packet sent out: Yes  Pre-cert/Authorization completed:  No prior auth required with these Dx codes for [employer] per online list.    Date: 12/14/22    Yuliet Machuca  Financial Securing/Prior Auth Dept  [phone]

## 2023-02-13 NOTE — TELEPHONE ENCOUNTER
Patient's insurance changed the first of the year. Called and left voicemail as well as a mychart message. No response from patient.   Subscriber to patient's 2022 insurance was father, he had updated his insurance in January to [employer] [place] [employer]. Checked website and patient has this as well. Added to his registration.     Per Ryan online list, no prior auth is required.

## 2023-02-20 ENCOUNTER — OFFICE VISIT (OUTPATIENT)
Dept: FAMILY MEDICINE | Facility: CLINIC | Age: 28
End: 2023-02-20
Payer: COMMERCIAL

## 2023-02-20 VITALS
HEART RATE: 64 BPM | WEIGHT: 131 LBS | TEMPERATURE: 98.2 F | SYSTOLIC BLOOD PRESSURE: 120 MMHG | BODY MASS INDEX: 18.34 KG/M2 | DIASTOLIC BLOOD PRESSURE: 74 MMHG | RESPIRATION RATE: 16 BRPM | OXYGEN SATURATION: 98 % | HEIGHT: 71 IN

## 2023-02-20 DIAGNOSIS — Z01.818 PREOP GENERAL PHYSICAL EXAM: Primary | ICD-10-CM

## 2023-02-20 DIAGNOSIS — J34.2 NASAL SEPTAL DEVIATION: ICD-10-CM

## 2023-02-20 DIAGNOSIS — F90.0 ADHD (ATTENTION DEFICIT HYPERACTIVITY DISORDER), INATTENTIVE TYPE: ICD-10-CM

## 2023-02-20 DIAGNOSIS — R09.81 CHRONIC NASAL CONGESTION: ICD-10-CM

## 2023-02-20 DIAGNOSIS — J34.3 NASAL TURBINATE HYPERTROPHY: ICD-10-CM

## 2023-02-20 PROBLEM — R04.0 EPISTAXIS: Status: RESOLVED | Noted: 2020-12-30 | Resolved: 2023-02-20

## 2023-02-20 PROCEDURE — 90471 IMMUNIZATION ADMIN: CPT | Performed by: FAMILY MEDICINE

## 2023-02-20 PROCEDURE — 90651 9VHPV VACCINE 2/3 DOSE IM: CPT | Performed by: FAMILY MEDICINE

## 2023-02-20 PROCEDURE — 99215 OFFICE O/P EST HI 40 MIN: CPT | Mod: 25 | Performed by: FAMILY MEDICINE

## 2023-02-20 RX ORDER — LISDEXAMFETAMINE DIMESYLATE 20 MG/1
20 CAPSULE ORAL EVERY MORNING
Qty: 30 CAPSULE | Refills: 0 | Status: SHIPPED | OUTPATIENT
Start: 2023-02-20 | End: 2023-05-03

## 2023-02-20 ASSESSMENT — ANXIETY QUESTIONNAIRES
IF YOU CHECKED OFF ANY PROBLEMS ON THIS QUESTIONNAIRE, HOW DIFFICULT HAVE THESE PROBLEMS MADE IT FOR YOU TO DO YOUR WORK, TAKE CARE OF THINGS AT HOME, OR GET ALONG WITH OTHER PEOPLE: SOMEWHAT DIFFICULT
GAD7 TOTAL SCORE: 9
5. BEING SO RESTLESS THAT IT IS HARD TO SIT STILL: NOT AT ALL
6. BECOMING EASILY ANNOYED OR IRRITABLE: MORE THAN HALF THE DAYS
2. NOT BEING ABLE TO STOP OR CONTROL WORRYING: MORE THAN HALF THE DAYS
3. WORRYING TOO MUCH ABOUT DIFFERENT THINGS: NEARLY EVERY DAY
4. TROUBLE RELAXING: SEVERAL DAYS
8. IF YOU CHECKED OFF ANY PROBLEMS, HOW DIFFICULT HAVE THESE MADE IT FOR YOU TO DO YOUR WORK, TAKE CARE OF THINGS AT HOME, OR GET ALONG WITH OTHER PEOPLE?: SOMEWHAT DIFFICULT
GAD7 TOTAL SCORE: 9
7. FEELING AFRAID AS IF SOMETHING AWFUL MIGHT HAPPEN: NOT AT ALL
7. FEELING AFRAID AS IF SOMETHING AWFUL MIGHT HAPPEN: NOT AT ALL
GAD7 TOTAL SCORE: 9
1. FEELING NERVOUS, ANXIOUS, OR ON EDGE: SEVERAL DAYS

## 2023-02-20 ASSESSMENT — PATIENT HEALTH QUESTIONNAIRE - PHQ9
10. IF YOU CHECKED OFF ANY PROBLEMS, HOW DIFFICULT HAVE THESE PROBLEMS MADE IT FOR YOU TO DO YOUR WORK, TAKE CARE OF THINGS AT HOME, OR GET ALONG WITH OTHER PEOPLE: VERY DIFFICULT
SUM OF ALL RESPONSES TO PHQ QUESTIONS 1-9: 13
SUM OF ALL RESPONSES TO PHQ QUESTIONS 1-9: 13

## 2023-02-20 ASSESSMENT — PAIN SCALES - GENERAL: PAINLEVEL: NO PAIN (0)

## 2023-02-20 NOTE — PATIENT INSTRUCTIONS
For informational purposes only. Not to replace the advice of your health care provider. Copyright   2003,  [facility]. All rights reserved. Clinically reviewed by Yudi Moore MD. [employer] [id number] - REV .  Preparing for Your Surgery  Getting started  A nurse will call you to review your health history and instructions. They will give you an arrival time based on your scheduled surgery time. Please be ready to share:    Your doctor's clinic name and phone number    Your medical, surgical, and anesthesia history    A list of allergies and sensitivities    A list of medicines, including herbal treatments and over-the-counter drugs    Whether the patient has a legal guardian (ask how to send us the papers in advance)  Please tell us if you're pregnant--or if there's any chance you might be pregnant. Some surgeries may injure a fetus (unborn baby), so they require a pregnancy test. Surgeries that are safe for a fetus don't always need a test, and you can choose whether to have one.   If you have a child who's having surgery, please ask for a copy of Preparing for Your Child's Surgery.    Preparing for surgery    Within 10 to 30 days of surgery: Have a pre-op exam (sometimes called an H&P, or History and Physical). This can be done at a clinic or pre-operative center.  ? If you're having a , you may not need this exam. Talk to your care team.    At your pre-op exam, talk to your care team about all medicines you take. If you need to stop any medicines before surgery, ask when to start taking them again.  ? We do this for your safety. Many medicines can make you bleed too much during surgery. Some change how well surgery (anesthesia) drugs work.    Call your insurance company to let them know you're having surgery. (If you don't have insurance, call [phone].)    Call your clinic if there's any change in your health. This includes signs of a cold or flu (sore throat, runny nose,  cough, rash, fever). It also includes a scrape or scratch near the surgery site.    If you have questions on the day of surgery, call your hospital or surgery center.  Eating and drinking guidelines  For your safety: Unless your surgeon tells you otherwise, follow the guidelines below.    Eat and drink as usual until 8 hours before you arrive for surgery. After that, no food or milk.    Drink clear liquids until 2 hours before you arrive. These are liquids you can see through, like water, Gatorade, and Propel Water. They also include plain black coffee and tea (no cream or milk), candy, and breath mints. You can spit out gum when you arrive.    If you drink alcohol: Stop drinking it the night before surgery.    If your care team tells you to take medicine on the morning of surgery, it's okay to take it with a sip of water.  Preventing infection    Shower or bathe the night before and morning of your surgery. Follow the instructions your clinic gave you. (If no instructions, use regular soap.)    Don't shave or clip hair near your surgery site. We'll remove the hair if needed.    Don't smoke or vape the morning of surgery. You may chew nicotine gum up to 2 hours before surgery. A nicotine patch is okay.  ? Note: Some surgeries require you to completely quit smoking and nicotine. Check with your surgeon.    Your care team will make every effort to keep you safe from infection. We will:  ? Clean our hands often with soap and water (or an alcohol-based hand rub).  ? Clean the skin at your surgery site with a special soap that kills germs.  ? Give you a special gown to keep you warm. (Cold raises the risk of infection.)  ? Wear special hair covers, masks, gowns and gloves during surgery.  ? Give antibiotic medicine, if prescribed. Not all surgeries need antibiotics.  What to bring on the day of surgery    Photo ID and insurance card    Copy of your health care directive, if you have one    Glasses and hearing aids (bring  cases)  ? You can't wear contacts during surgery    Inhaler and eye drops, if you use them (tell us about these when you arrive)    CPAP machine or breathing device, if you use them    A few personal items, if spending the night    If you have . . .  ? A pacemaker, ICD (cardiac defibrillator) or other implant: Bring the ID card.  ? An implanted stimulator: Bring the remote control.  ? A legal guardian: Bring a copy of the certified (court-stamped) guardianship papers.  Please remove any jewelry, including body piercings. Leave jewelry and other valuables at home.  If you're going home the day of surgery    You must have a responsible adult drive you home. They should stay with you overnight as well.    If you don't have someone to stay with you, and you aren't safe to go home alone, we may keep you overnight. Insurance often won't pay for this.  After surgery  If it's hard to control your pain or you need more pain medicine, please call your surgeon's office.  Questions?   If you have any questions for your care team, list them here: _________________________________________________________________________________________________________________________________________________________________________ ____________________________________ ____________________________________ ____________________________________

## 2023-02-20 NOTE — NURSING NOTE
Prior to immunization administration, verified patients identity using patient s name and date of birth. Please see Immunization Activity for additional information.     Screening Questionnaire for Adult Immunization    Are you sick today?   No   Do you have allergies to medications, food, a vaccine component or latex?   No   Have you ever had a serious reaction after receiving a vaccination?   No   Do you have a long-term health problem with heart, lung, kidney, or metabolic disease (e.g., diabetes), asthma, a blood disorder, no spleen, complement component deficiency, a cochlear implant, or a spinal fluid leak?  Are you on long-term aspirin therapy?   No   Do you have cancer, leukemia, HIV/AIDS, or any other immune system problem?   No   Do you have a parent, brother, or sister with an immune system problem?   No   In the past 3 months, have you taken medications that affect  your immune system, such as prednisone, other steroids, or anticancer drugs; drugs for the treatment of rheumatoid arthritis, Crohn s disease, or psoriasis; or have you had radiation treatments?   No   Have you had a seizure, or a brain or other nervous system problem?   No   During the past year, have you received a transfusion of blood or blood    products, or been given immune (gamma) globulin or antiviral drug?   No   For women: Are you pregnant or is there a chance you could become       pregnant during the next month?   No   Have you received any vaccinations in the past 4 weeks?   No     Immunization questionnaire answers were all negative.        Per orders of Dr. Heath, injection of HPV given by Gill Owen RN. Patient instructed to remain in clinic for 15 minutes afterwards, and to report any adverse reaction to me immediately.       Screening performed by Gill Owne RN on 2/20/2023 at 2:06 PM.

## 2023-02-20 NOTE — PROGRESS NOTES
[facility]  [address], [address]  [facility] [address]  Phone: [phone]  Primary Provider: Shalini De Leon  Pre-op Performing Provider: SHALINI DE LEON      PREOPERATIVE EVALUATION:  Today's date: 2/20/2023    Boris Hartman is a 27 year old male who presents for a preoperative evaluation.    Surgical Information:  Surgery/Procedure: SEPTOPLASTY; BILATERAL INFERIOR TURBINATE REDUCTION  Surgery Location: [facility]   Surgeon: Trev Gu MD  Surgery Date: 2/27/2023  Time of Surgery: 7:45 AM  Where patient plans to recover: At home with family  Fax number for surgical facility: Note does not need to be faxed, will be available electronically in Epic.    Type of Anesthesia Anticipated: General    Assessment & Plan     The proposed surgical procedure is considered INTERMEDIATE risk.    Preop general physical exam  He is cleared to proceed with the septoplasty and bilateral inferior turbinate reduction surgery as scheduled.    Chronic nasal congestion    Nasal septal deviation    Nasal turbinate hypertrophy    ADHD (attention deficit hyperactivity disorder), inattentive type  We discussed treatment options and decided to restart Vyvanse at 20 mg daily.  In the past he was up to 40 mg daily.  I recommended he schedule follow-up with me in the next couple of months to check in and see how this is going.  He is going to hold the Vyvanse in the morning of surgery.  - lisdexamfetamine (VYVANSE) 20 MG capsule; Take 1 capsule (20 mg) by mouth every morning    Anxiety and depression  He has tried a variety of medications including fluoxetine, sertraline, Wellbutrin and Lexapro which were not very effective.  He is hoping that getting the ADHD symptoms under better control will help improve his anxiety and depression symptoms.               RECOMMENDATION:  APPROVAL GIVEN to proceed with proposed procedure, without further diagnostic evaluation.      40 minutes  spent on the date of the encounter doing chart review, review of test results, interpretation of tests, patient visit and documentation       Subjective     HPI related to upcoming procedure:     He has a history of chronic nasal congestion symptoms.  He was seen by ENT for this and has a known septal deviation to the left.  They recommended septoplasty with inferior turbinate reduction.      He also has a history of ADHD, anxiety and depression.  He has been on a variety of different medications including Lexapro, sertraline and fluoxetine which were not helpful.  He was also on Wellbutrin, but when he increase the dose to 300 mg daily he started having increased anxiety symptoms, especially around eating meals.  He reports being diagnosed with ADHD when he was in third grade.  He was started on medication in middle school which he remembers being somewhat helpful.  He most recently was on Vyvanse which she tolerated well, but he has been off this for at least a couple of years.  He has noticed that attention, focus and distractibility issues have been worse over the past couple of years since being off the medication.  He is interested in restarting this if possible.  He thinks that getting the ADHD symptoms under better control should help improve his anxiety and depression symptoms.        Preop Questions 2/20/2023   1. Have you ever had a heart attack or stroke? No   2. Have you ever had surgery on your heart or blood vessels, such as a stent placement, a coronary artery bypass, or surgery on an artery in your head, neck, heart, or legs? No   3. Do you have chest pain with activity? No   4. Do you have a history of  heart failure? No   5. Do you currently have a cold, bronchitis or symptoms of other infection? No   6. Do you have a cough, shortness of breath, or wheezing? No   7. Do you or anyone in your family have previous history of blood clots? UNKNOWN    8. Do you or does anyone in your family have a  serious bleeding problem such as prolonged bleeding following surgeries or cuts? No   9. Have you ever had problems with anemia or been told to take iron pills? No   10. Have you had any abnormal blood loss such as black, tarry or bloody stools? No   11. Have you ever had a blood transfusion? No   12. Are you willing to have a blood transfusion if it is medically needed before, during, or after your surgery? Yes   13. Have you or any of your relatives ever had problems with anesthesia? No   14. Do you have sleep apnea, excessive snoring or daytime drowsiness? No   15. Do you have any artifical heart valves or other implanted medical devices like a pacemaker, defibrillator, or continuous glucose monitor? No   16. Do you have artificial joints? No   17. Are you allergic to latex? No         Preoperative Review of :   reviewed - controlled substances reflected in medication list.          Review of Systems  CONSTITUTIONAL: NEGATIVE for fever, chills, change in weight  INTEGUMENTARY/SKIN: NEGATIVE for worrisome rashes, moles or lesions  EYES: NEGATIVE for vision changes or irritation  ENT/MOUTH: NEGATIVE for ear, mouth and throat problems  RESP: NEGATIVE for significant cough or SOB  CV: NEGATIVE for chest pain, palpitations or peripheral edema  GI: NEGATIVE for nausea, abdominal pain, heartburn, or change in bowel habits  : NEGATIVE for frequency, dysuria, or hematuria  MUSCULOSKELETAL: NEGATIVE for significant arthralgias or myalgia  NEURO: NEGATIVE for weakness, dizziness or paresthesias  ENDOCRINE: NEGATIVE for temperature intolerance, skin/hair changes  HEME: NEGATIVE for bleeding problems  PSYCHIATRIC: NEGATIVE for changes in mood or affect    Patient Active Problem List    Diagnosis Date Noted     Nasal obstruction 12/30/2020     Priority: Medium     Added automatically from request for surgery [id number]       Nasal septal deviation 12/30/2020     Priority: Medium     Added automatically from request for  "surgery [id number]       Nasal turbinate hypertrophy 12/30/2020     Priority: Medium     Added automatically from request for surgery [id number]        No past medical history on file.  No past surgical history on file.  Current Outpatient Medications   Medication Sig Dispense Refill     lisdexamfetamine (VYVANSE) 20 MG capsule Take 1 capsule (20 mg) by mouth every morning 30 capsule 0     amphetamine-dextroamphetamine (ADDERALL) 10 MG tablet Take 10 mg by mouth (Patient not taking: Reported on 10/17/2022)         Allergies   Allergen Reactions     Sulfa Drugs Hives        Social History     Tobacco Use     Smoking status: Never     Smokeless tobacco: Never   Substance Use Topics     Alcohol use: Not on file       History   Drug Use Not on file         Objective     /74 (BP Location: Left arm, Patient Position: Sitting, Cuff Size: Adult Regular)   Pulse 64   Temp 98.2  F (36.8  C) (Temporal)   Resp 16   Ht 1.797 m (5' 10.75\")   Wt 59.4 kg (131 lb)   SpO2 98%   BMI 18.40 kg/m      Physical Exam    GENERAL APPEARANCE: healthy, alert and no distress     EYES: EOMI,  PERRL     HENT:  mouth without ulcers or lesions.  The septum is deviated to the left     NECK: no adenopathy, no asymmetry, masses, or scars and thyroid normal to palpation     RESP: lungs clear to auscultation - no rales, rhonchi or wheezes     CV: regular rates and rhythm, normal S1 S2, no S3 or S4 and no murmur, click or rub     ABDOMEN:  soft, nontender, no HSM or masses and bowel sounds normal     MS: extremities normal- no gross deformities noted, no evidence of inflammation in joints, FROM in all extremities.     SKIN: no suspicious lesions or rashes     NEURO: Normal strength and tone, sensory exam grossly normal, mentation intact and speech normal     PSYCH: mentation appears normal. and affect normal/bright     LYMPHATICS: No cervical adenopathy    No results for input(s): HGB, PLT, INR, NA, POTASSIUM, CR, A1C in the last 20538 hours. "     Diagnostics:  No labs were ordered during this visit.   No EKG required, no history of coronary heart disease, significant arrhythmia, peripheral arterial disease or other structural heart disease.    Revised Cardiac Risk Index (RCRI):  The patient has the following serious cardiovascular risks for perioperative complications:   - No serious cardiac risks = 0 points     RCRI Interpretation: 0 points: Class I (very low risk - 0.4% complication rate)           Signed Electronically by: Anthony Meng DO  Copy of this evaluation report is provided to requesting physician.      Answers for HPI/ROS submitted by the patient on 2/20/2023  If you checked off any problems, how difficult have these problems made it for you to do your work, take care of things at home, or get along with other people?: Very difficult  PHQ9 TOTAL SCORE: 13

## 2023-02-20 NOTE — TELEPHONE ENCOUNTER
----- Message from Yudy Claire sent at 2/20/2023  8:59 AM CST -----  Contact: [phone]  Pt is requesting you enter orders for her yearly mammo and she would like a call to assist her with scheduling once completed. Thanks     RN Triage:    Spoke with mom, Rowena, about 24 yr old Boris.    Consent to communicate with mom has been documented.    Mom reports the following:    Pt was seen in office, 7/18/19, first visit with Dr. Ivana Meng for anxiety and depression/ADHD.    Decided to drop anxiety medication and switch to ADHD medication.    Pt is going back to college in a month.    Pt has been having increased anxiety this past week.    Pt is wondering if he should be back on Sertraline?    Pt has taken a dose yesterday, and this morning.    Pt has a 3.5 pills left of Sertraline 100 mg tablets, 1.5 tablets daily.    Mom reports pt had been weaning from the Sertraline before his OV and is wondering if he stopped this too quickly.    Had some dizziness when first stopping the medication, but this resolved.    Lack of appetite the past few days.    Taking Vyvanse as ordered.    Mom is asking  if they should schedule another OV this week.    PLAN:  Will consult with PCP per mom/pt request regarding need for Sertraline for increased anxiety after stopping this medication on 7/18/19.  Please advise.  Advised mom to call back if symptoms worsening.    Birdie Alberto RN   [facility] RN Triage      Reason for Disposition    Patient wants to be seen    Protocols used: ANXIETY AND PANIC ATTACK-A-OH

## 2023-02-20 NOTE — PROGRESS NOTES
[facility]  [address], [address]  [facility] [address]  Phone: [phone]  Primary Provider: Shalini De Leon  Pre-op Performing Provider: SHALINI DE LEON    {Provider  Link to PREOP SmartSet  Use this to apply standard patient instructions to AVS; includes medication directions, common orders, guidelines for anemia, warfarin, additional testing   :416040}  PREOPERATIVE EVALUATION:  Today's date: 2/20/2023    Boris Hartman is a 27 year old male who presents for a preoperative evaluation.    Surgical Information:  Surgery/Procedure: SEPTOPLASTY; BILATERAL INFERIOR TURBINATE REDUCTION  Surgery Location: [facility]  Surgeon: Trev Gu MD  Surgery Date: 2/27/2023  Time of Surgery: 7:45am  Where patient plans to recover: {Preop post recovery plans :269816}  Fax number for surgical facility: Note does not need to be faxed, will be available electronically in Epic.    Type of Anesthesia Anticipated: {ANESTHESIA:051640}    {2021 Provider Charting Preference for Preop :099348}    Subjective     HPI related to upcoming procedure: ***    {Click here to pull in Questionnaire Data after Qnr completed :443411}  Health Care Directive:  Patient does not have a Health Care Directive or Living Will: {ADVANCE_DIRECTIVE_STATUS:627559}    Preoperative Review of :  {Mnpmpreport:880863}  {Review MNPMP for all patients per ICSI MNPMP Profile:815590}    {Chronic problem details (Optional) :135868}    Review of Systems  {ROS Preop Choices:013529}    Patient Active Problem List    Diagnosis Date Noted     Nasal obstruction 12/30/2020     Priority: Medium     Added automatically from request for surgery [id number]       Nasal septal deviation 12/30/2020     Priority: Medium     Added automatically from request for surgery [id number]       Nasal turbinate hypertrophy 12/30/2020     Priority: Medium     Added automatically from request for  "surgery [id number]       Epistaxis 2020     Priority: Medium     Added automatically from request for surgery [id number]        No past medical history on file.  No past surgical history on file.  Current Outpatient Medications   Medication Sig Dispense Refill     amphetamine-dextroamphetamine (ADDERALL) 10 MG tablet Take 10 mg by mouth (Patient not taking: Reported on 10/17/2022)       buPROPion (WELLBUTRIN XL) 150 MG 24 hr tablet Take 150 mg by mouth daily (Patient not taking: Reported on 2022)       FLUoxetine (PROZAC) 40 MG capsule Take 40 mg by mouth daily (Patient not taking: Reported on 10/17/2022)         Allergies   Allergen Reactions     Sulfa Drugs Hives        Social History     Tobacco Use     Smoking status: Never     Smokeless tobacco: Never   Substance Use Topics     Alcohol use: Not on file     {FAMILY HISTORY (Optional):360860434}  History   Drug Use Not on file         Objective     There were no vitals taken for this visit.    Physical Exam  {EXAM Preop Choices:716437}    No results for input(s): HGB, PLT, INR, NA, POTASSIUM, CR, A1C in the last 64275 hours.     Diagnostics:  {LABS:402745}   {EK}    Revised Cardiac Risk Index (RCRI):  The patient has the following serious cardiovascular risks for perioperative complications:  {PREOP REVISED CARDIAC RISK INDEX (RCRI) :074635::\" - No serious cardiac risks = 0 points\"}     RCRI Interpretation: {REVISED CARDIAC RISK INTERPRETATION :194912}         Signed Electronically by: Anthony Meng DO  Copy of this evaluation report is provided to requesting physician.    {Provider Resources  Preop [facility] Preop Guidelines  Revised Cardiac Risk Index :253405}  "

## 2023-02-24 ENCOUNTER — ANESTHESIA EVENT (OUTPATIENT)
Dept: SURGERY | Facility: AMBULATORY SURGERY CENTER | Age: 28
End: 2023-02-24
Payer: COMMERCIAL

## 2023-02-27 ENCOUNTER — HOSPITAL ENCOUNTER (OUTPATIENT)
Facility: AMBULATORY SURGERY CENTER | Age: 28
Discharge: HOME | End: 2023-02-27
Attending: OTOLARYNGOLOGY | Admitting: OTOLARYNGOLOGY
Payer: COMMERCIAL

## 2023-02-27 ENCOUNTER — ANESTHESIA (OUTPATIENT)
Dept: SURGERY | Facility: AMBULATORY SURGERY CENTER | Age: 28
End: 2023-02-27
Payer: COMMERCIAL

## 2023-02-27 VITALS
BODY MASS INDEX: 18.4 KG/M2 | OXYGEN SATURATION: 100 % | RESPIRATION RATE: 16 BRPM | WEIGHT: 131 LBS | HEART RATE: 59 BPM | SYSTOLIC BLOOD PRESSURE: 124 MMHG | TEMPERATURE: 98.2 F | DIASTOLIC BLOOD PRESSURE: 85 MMHG

## 2023-02-27 DIAGNOSIS — J34.89 NASAL OBSTRUCTION: Primary | ICD-10-CM

## 2023-02-27 DIAGNOSIS — J34.2 NASAL SEPTAL DEVIATION: ICD-10-CM

## 2023-02-27 DIAGNOSIS — J34.3 NASAL TURBINATE HYPERTROPHY: ICD-10-CM

## 2023-02-27 PROCEDURE — 30140 RESECT INFERIOR TURBINATE: CPT | Mod: 50 | Performed by: OTOLARYNGOLOGY

## 2023-02-27 PROCEDURE — G8907 PT DOC NO EVENTS ON DISCHARG: HCPCS

## 2023-02-27 PROCEDURE — G8916 PT W IV AB GIVEN ON TIME: HCPCS

## 2023-02-27 PROCEDURE — 30520 REPAIR OF NASAL SEPTUM: CPT | Performed by: OTOLARYNGOLOGY

## 2023-02-27 PROCEDURE — 30140 RESECT INFERIOR TURBINATE: CPT | Mod: 50

## 2023-02-27 PROCEDURE — 30520 REPAIR OF NASAL SEPTUM: CPT

## 2023-02-27 RX ORDER — LIDOCAINE HYDROCHLORIDE AND EPINEPHRINE 10; 10 MG/ML; UG/ML
INJECTION, SOLUTION INFILTRATION; PERINEURAL PRN
Status: DISCONTINUED | OUTPATIENT
Start: 2023-02-27 | End: 2023-02-27 | Stop reason: HOSPADM

## 2023-02-27 RX ORDER — ONDANSETRON 2 MG/ML
4 INJECTION INTRAMUSCULAR; INTRAVENOUS EVERY 30 MIN PRN
Status: DISCONTINUED | OUTPATIENT
Start: 2023-02-27 | End: 2023-02-28 | Stop reason: HOSPADM

## 2023-02-27 RX ORDER — ACETAMINOPHEN 325 MG/1
975 TABLET ORAL ONCE
Status: COMPLETED | OUTPATIENT
Start: 2023-02-27 | End: 2023-02-27

## 2023-02-27 RX ORDER — PROPOFOL 10 MG/ML
INJECTION, EMULSION INTRAVENOUS PRN
Status: DISCONTINUED | OUTPATIENT
Start: 2023-02-27 | End: 2023-02-27

## 2023-02-27 RX ORDER — COCAINE HYDROCHLORIDE 40 MG/ML
SOLUTION NASAL PRN
Status: DISCONTINUED | OUTPATIENT
Start: 2023-02-27 | End: 2023-02-27 | Stop reason: HOSPADM

## 2023-02-27 RX ORDER — FENTANYL CITRATE 50 UG/ML
INJECTION, SOLUTION INTRAMUSCULAR; INTRAVENOUS PRN
Status: DISCONTINUED | OUTPATIENT
Start: 2023-02-27 | End: 2023-02-27

## 2023-02-27 RX ORDER — OXYCODONE HYDROCHLORIDE 5 MG/1
5 TABLET ORAL ONCE
Status: COMPLETED | OUTPATIENT
Start: 2023-02-27 | End: 2023-02-27

## 2023-02-27 RX ORDER — LIDOCAINE 40 MG/G
CREAM TOPICAL
Status: DISCONTINUED | OUTPATIENT
Start: 2023-02-27 | End: 2023-02-28 | Stop reason: HOSPADM

## 2023-02-27 RX ORDER — CEPHALEXIN 500 MG/1
500 CAPSULE ORAL 3 TIMES DAILY
Qty: 24 CAPSULE | Refills: 0 | Status: SHIPPED | OUTPATIENT
Start: 2023-02-27 | End: 2023-03-07

## 2023-02-27 RX ORDER — PROPOFOL 10 MG/ML
INJECTION, EMULSION INTRAVENOUS CONTINUOUS PRN
Status: DISCONTINUED | OUTPATIENT
Start: 2023-02-27 | End: 2023-02-27

## 2023-02-27 RX ORDER — ONDANSETRON 4 MG/1
4 TABLET, ORALLY DISINTEGRATING ORAL EVERY 30 MIN PRN
Status: DISCONTINUED | OUTPATIENT
Start: 2023-02-27 | End: 2023-02-28 | Stop reason: HOSPADM

## 2023-02-27 RX ORDER — LIDOCAINE HYDROCHLORIDE 20 MG/ML
INJECTION, SOLUTION INFILTRATION; PERINEURAL PRN
Status: DISCONTINUED | OUTPATIENT
Start: 2023-02-27 | End: 2023-02-27

## 2023-02-27 RX ORDER — CEFAZOLIN SODIUM 2 G/100ML
2 INJECTION, SOLUTION INTRAVENOUS
Status: COMPLETED | OUTPATIENT
Start: 2023-02-27 | End: 2023-02-27

## 2023-02-27 RX ORDER — HYDROCODONE BITARTRATE AND ACETAMINOPHEN 5; 325 MG/1; MG/1
1-2 TABLET ORAL EVERY 6 HOURS PRN
Qty: 12 TABLET | Refills: 0 | Status: SHIPPED | OUTPATIENT
Start: 2023-02-27 | End: 2023-04-17

## 2023-02-27 RX ORDER — CEFAZOLIN SODIUM 2 G/100ML
2 INJECTION, SOLUTION INTRAVENOUS SEE ADMIN INSTRUCTIONS
Status: DISCONTINUED | OUTPATIENT
Start: 2023-02-27 | End: 2023-02-28 | Stop reason: HOSPADM

## 2023-02-27 RX ORDER — FENTANYL CITRATE 50 UG/ML
50 INJECTION, SOLUTION INTRAMUSCULAR; INTRAVENOUS EVERY 5 MIN PRN
Status: DISCONTINUED | OUTPATIENT
Start: 2023-02-27 | End: 2023-02-28 | Stop reason: HOSPADM

## 2023-02-27 RX ORDER — BACITRACIN ZINC 500 [USP'U]/G
OINTMENT TOPICAL PRN
Status: DISCONTINUED | OUTPATIENT
Start: 2023-02-27 | End: 2023-02-27 | Stop reason: HOSPADM

## 2023-02-27 RX ORDER — SODIUM CHLORIDE, SODIUM LACTATE, POTASSIUM CHLORIDE, CALCIUM CHLORIDE 600; 310; 30; 20 MG/100ML; MG/100ML; MG/100ML; MG/100ML
INJECTION, SOLUTION INTRAVENOUS CONTINUOUS
Status: DISCONTINUED | OUTPATIENT
Start: 2023-02-27 | End: 2023-02-28 | Stop reason: HOSPADM

## 2023-02-27 RX ORDER — FENTANYL CITRATE 50 UG/ML
25 INJECTION, SOLUTION INTRAMUSCULAR; INTRAVENOUS EVERY 5 MIN PRN
Status: DISCONTINUED | OUTPATIENT
Start: 2023-02-27 | End: 2023-02-28 | Stop reason: HOSPADM

## 2023-02-27 RX ADMIN — SODIUM CHLORIDE, SODIUM LACTATE, POTASSIUM CHLORIDE, CALCIUM CHLORIDE: 600; 310; 30; 20 INJECTION, SOLUTION INTRAVENOUS at 07:45

## 2023-02-27 RX ADMIN — PROPOFOL 200 MG: 10 INJECTION, EMULSION INTRAVENOUS at 07:45

## 2023-02-27 RX ADMIN — FENTANYL CITRATE 50 MCG: 50 INJECTION, SOLUTION INTRAMUSCULAR; INTRAVENOUS at 07:45

## 2023-02-27 RX ADMIN — ACETAMINOPHEN 975 MG: 325 TABLET ORAL at 06:57

## 2023-02-27 RX ADMIN — PROPOFOL 200 MCG/KG/MIN: 10 INJECTION, EMULSION INTRAVENOUS at 07:45

## 2023-02-27 RX ADMIN — CEFAZOLIN SODIUM 2 G: 2 INJECTION, SOLUTION INTRAVENOUS at 07:40

## 2023-02-27 RX ADMIN — OXYCODONE HYDROCHLORIDE 5 MG: 5 TABLET ORAL at 09:37

## 2023-02-27 RX ADMIN — Medication 40 MG: at 07:45

## 2023-02-27 RX ADMIN — Medication 10 MG: at 08:21

## 2023-02-27 RX ADMIN — LIDOCAINE HYDROCHLORIDE 60 MG: 20 INJECTION, SOLUTION INFILTRATION; PERINEURAL at 07:45

## 2023-02-27 NOTE — DISCHARGE INSTRUCTIONS
Instructions following Septoplasty    Recovery - Everyone recovers differently from a general anesthetic.  Symptoms such as fatigue, nausea, lightheadedness, and sometimes a low grade fever (up to 101 degrees) are not unusual.  As your body removes the anesthetic drugs from circulation, these symptoms will resolve.  Your nose will be sore after surgery, and you may even have symptoms similar to a sinus infection with headache, congestion, and pressure.  These will resolve with healing.  For several days you may experience bloody drainage from the nose, please use the drip pad as necessary for this. Call the office if the bleeding persists after 3 days or is perfuse. There are no diet restrictions after septoplasty, and you can resume your home medications except for blood thinners.  Please do not blow your nose for two weeks after surgery. You may gently dab your nose with Kleenex. Limit your activity to no strenuous activities until I see you for the first follow up visit, and sleep with your head elevated.    Medications - You were sent home with narcotic pain medication.  If you can tolerate the discomfort during your recovery by using just plain Tylenol or ibuprofen (advil), please do so.  However, do not hesitate to use the stronger pain medication if needed.  If you were sent home with an antibiotic, it is primarily used to help the healing process.  If it causes loose bowel movements or other signs of intolerance, it is appropriate to discontinue it.      Complications - Problems related to septoplasty almost always are detected during the operation, and special instruction will be given in that situation.  However, unexpected things can happen.  If you experience persistent bleeding, fevers, changes in vision, severe headache or nasal pain, this may be the sign of an infection.  Any of these symptoms should be called into my office or to the on call ENT if after hours. There may be small plastic pieces  placed inside your nose during surgery (splints). These help to promote the septum into healing in its straightened position, and will be removed at the follow up visit.    Follow up - Splints will be removed at the follow up visit. This is simple and takes just a few seconds afterwards, the improvement you will expereince in breathing from the septoplasty is usually dramatic and immediate.      If there are any questions or issues with the above, or if there are other issues that concern you, always feel free to call the clinic and I am happy to speak with you as soon as I can.    Trev Gu MD  Otolaryngology  [facility]  [phone] or [phone]     or [phone] After hours, [facility] option      You received Tylenol at 0700. You make take more 6 to 8 hours from then. Maximum dose of Tylenol ( Acetaminophen) is 4000mg in 24 hours.

## 2023-02-27 NOTE — OP NOTE
PREOPERATIVE DIAGNOSIS  1. Nasal obstruction.   2. Septal deviation.   3. Bilateral turbinate hypertrophy    POSTOPERATIVE DIAGNOSIS  1. Nasal obstruction.   2. Septal deviation.   3. Bilateral turbinate hypertrophy    PROCEDURES PERFORMED:   1. Septoplasty  2. Bilateral submucous resection of inferior turbinates.   3. Bilateral inferior turbinate out-fracture    SURGEON: Trev Gu MD   ASSISTANTS: none  BLOOD LOSS: 15 mL.   COMPLICATIONS: None.   SPECIMENS: None.   ANESTHESIA: GETA.   DRAINS, IMPLANTS, and DEVICES: bilateral Cruz splints    INDICATIONS: Boris Hartman presented to me with a history of chronic nasal obstruction. On evaluation, the patient had a deviated septum to the left and bilateral inferior turbinate hypertrophy. Therefore, my recommendation was for the above-named procedures. Preoperatively, risks discussed included the risks of infection, bleeding, the risks of general anesthesia, possible injury to the eyes, base of skull and tear duct system, and possible failure of the surgery to achieve the desired result, septal perforation, and need for revision. The patient understood these risks and possible outcomes and wished to proceed.   OPERATIVE PROCEDURE: After being taken to the operating room and induction of general endotracheal tube anesthesia, the bed was rotated 90 degrees. A procedural pause was performed to identify the patient by name, birthday, and procedure. After that, I began by applying topical anesthetic in the form of 2 cottonoids on each side of the nose which had been soaked with a total of 4 mL of 4% liquid cocaine. In addition, I injected 1% lidocaine with 1:100,000 epinephrine into the right hemitransfixion incision area, bilateral anterior septum, and overlying any spurs. They were prepped and draped in the normal clean fashion.  I removed the cottonoids from the nose and entered the right nasal cavity.   I made a right hemitransfixion septoplasty incision in the right  "nasal vestibule in a traditional open fashion. I then dissected down onto the left side of the cartilaginous septum through the right-sided incision. I then was able to start a submucoperichondrial pocket directly on the left side of the cartilaginous septum. The patient had a deviated maxillary crest as well as the cartilage deflected to the left off the maxillary crest creating a large spur posteriorly impacting the left inferior turbinate. After I completely elevated the mucoperichondrium off the left side of the septum and the bony spur, I continued posterior raising a flap off the bone. I then made a chondrotomy incision through the cartilage in a \"C\" shape fashion approximately 1 cm back from the anterior edge and 1.5 cm from the dorsum. I broke over to the right side and raised a submucoperiosteal flap on the entire right side of the nasal septum. After this was done, I freed the cartilage from the bony septum, and I removed it in one large piece. It was brought to the back table and carved free of the defelection for later reinserted back into the mucoperichondrial pocket. I then used a Rip to remove the deflected maxillary crest on the left side. I also used a double-action scissors and removed a portion of the posterior bony septum by cutting superiorly leaving an adequate strut. Some of the bony septum was removed and discarded including the left bony spur. I cauterized a small bleeder from the maxillary crest with suction cautery. I placed the cartilage back in to the flaps. I laid the flaps back together and this significantly improved the nasal airway. I irrigated the septum in between the flaps. I then closed my septoplasty incision with 3 simple interrupted 4-0 chromic gut sutures.   I proceeded to the final components of the operation, which was submucous resection of inferior turbinates with out-fracture.  I injected both inferior turbinates along their entire length with 1% lidocaine, " 1:100,000 epinephrine using a spinal needle. I used the 15 blade to make a stab incision at the head of the right inferior turbinate. I then used a caudal to free the mucoperiosteum from the inferior turbinate bone along the medial aspect of the bone all the way posteriorly. Using a 2.0 mm medtronic shaver blade, I slowly entered the pocket and ran the shaver function to perform my submucous resection of the right inferior turbinate. I used the shaver along the entire length of the inferior turbinate from anterior to posterior.   I then performed the same procedure on the left side. Once again using the 15 blade, I made a stab incision at the head of the left inferior turbinate.  I then used a caudal to free the mucoperiosteum from the inferior turbinate bone along the medial aspect of the bone all the way posteriorly. Using a 2.0 mm medtronic shaver blade, I slowly entered the pocket and ran the shaver function to perform my submucous resection of the left inferior turbinate. I used the shaver along the entire length of the inferior turbinate from anterior to posterior. I closed each stab incision with a simple interrupted 5-0 chromic suture.   Lastly I performed out-fracture by using the back end of the caudal to fracture both inferior turbinate bones laterally. I did this at multiple spots along each bone. I then used the longest nasal speculum to fracture the entire inferior turbinates laterally on both sides. The airway was now wide open on both sides.   Lastly, I placed a Cruz stent on each side and stitched them together with a 3-0 nylon suture.  At this point, the entire procedure was now complete. I reinspected both sides of the nose and there was good hemostasis with a greatly improved airway bilaterally.  All instruments were accounted for and all counts were correct. The patient's bed was rotated 90 degrees back to the care of anesthesia. They were awakened, extubated and sent to the recovery room in  good condition.

## 2023-02-27 NOTE — ANESTHESIA CARE TRANSFER NOTE
Patient: Boris Hartman    Procedure: Procedure(s):  SEPTOPLASTY; BILATERAL INFERIOR TURBINATE REDUCTION       Diagnosis: Nasal obstruction [J34.89]  Nasal turbinate hypertrophy [J34.3]  Nasal septal deviation [J34.2]  Diagnosis Additional Information: No value filed.    Anesthesia Type:   General     Note:    Oropharynx: oropharynx clear of all foreign objects  Level of Consciousness: drowsy  Oxygen Supplementation: face mask    Independent Airway: airway patency satisfactory and stable  Dentition: dentition unchanged  Vital Signs Stable: post-procedure vital signs reviewed and stable  Report to RN Given: handoff report given  Patient transferred to: PACU    Handoff Report: Identifed the Patient, Identified the Reponsible Provider, Reviewed the pertinent medical history, Discussed the surgical course, Reviewed Intra-OP anesthesia mangement and issues during anesthesia, Set expectations for post-procedure period and Allowed opportunity for questions and acknowledgement of understanding      Vitals:  Vitals Value Taken Time   /52 02/27/23 0900   Temp 98.2  F (36.8  C) 02/27/23 0900   Pulse 67 02/27/23 0903   Resp 19 02/27/23 0903   SpO2 99 % 02/27/23 0903   Vitals shown include unvalidated device data.    Electronically Signed By: KARISSA Mariee CRNA  February 27, 2023  9:03 AM

## 2023-02-27 NOTE — ANESTHESIA PREPROCEDURE EVALUATION
Anesthesia Pre-Procedure Evaluation    Patient: Boris Hartman   MRN: [id number] : 1995        Procedure : Procedure(s):  SEPTOPLASTY; BILATERAL INFERIOR TURBINATE REDUCTION          History reviewed. No pertinent past medical history.   History reviewed. No pertinent surgical history.   Allergies   Allergen Reactions     Sulfa Drugs Hives      Social History     Tobacco Use     Smoking status: Never     Smokeless tobacco: Never   Substance Use Topics     Alcohol use: Not Currently      Wt Readings from Last 1 Encounters:   23 59.4 kg (131 lb)        Anesthesia Evaluation   Pt has had prior anesthetic.     No history of anesthetic complications       ROS/MED HX  ENT/Pulmonary: Comment: Nasal obstruction    Vapes THC      Neurologic:  - neg neurologic ROS     Cardiovascular:  - neg cardiovascular ROS  (-) murmur   METS/Exercise Tolerance: >4 METS    Hematologic:  - neg hematologic  ROS     Musculoskeletal:  - neg musculoskeletal ROS     GI/Hepatic:  - neg GI/hepatic ROS     Renal/Genitourinary:  - neg Renal ROS     Endo:  - neg endo ROS     Psychiatric/Substance Use:  - neg psychiatric ROS     Infectious Disease:  - neg infectious disease ROS     Malignancy:  - neg malignancy ROS     Other:  - neg other ROS          Physical Exam    Airway        Mallampati: I   TM distance: > 3 FB   Neck ROM: full   Mouth opening: > 3 cm    Respiratory Devices and Support         Dental     Comment: Teeth examined without any significant abnormality, patient denies loose, missing or chipped teeth or anything removable.         Cardiovascular          Rhythm and rate: regular and normal (-) no murmur    Pulmonary   pulmonary exam normal        breath sounds clear to auscultation           OUTSIDE LABS:  CBC: No results found for: WBC, HGB, HCT, PLT  BMP: No results found for: NA, POTASSIUM, CHLORIDE, CO2, BUN, CR, GLC  COAGS: No results found for: PTT, INR, FIBR  POC: No results found for: BGM, HCG, HCGS  HEPATIC: No  results found for: ALBUMIN, PROTTOTAL, ALT, AST, GGT, ALKPHOS, BILITOTAL, BILIDIRECT, NAOMIE  OTHER: No results found for: PH, LACT, A1C, RAHUL, PHOS, MAG, LIPASE, AMYLASE, TSH, T4, T3, CRP, SED    Anesthesia Plan    ASA Status:  2   NPO Status:  NPO Appropriate    Anesthesia Type: General.   Induction: Intravenous, Propofol.   Maintenance: Balanced.        Consents    Anesthesia Plan(s) and associated risks, benefits, and realistic alternatives discussed. Questions answered and patient/representative(s) expressed understanding.    - Discussed:     - Discussed with:  Patient      - Extended Intubation/Ventilatory Support Discussed: No.      - Patient is DNR/DNI Status: No    Use of blood products discussed: No .     Postoperative Care    Pain management: IV analgesics, Oral pain medications, Multi-modal analgesia.   PONV prophylaxis: Ondansetron (or other 5HT-3), Dexamethasone or Solumedrol, Background Propofol Infusion     Comments:    Other Comments: Discussed plan for general anesthetic with Oral ETT. Discussed risks of sore throat, post op pain/nausea, oropharyngeal damage, rare major complications.         H&P reviewed: Unable to attach H&P to encounter due to EHR limitations. H&P Update: appropriate H&P reviewed, patient examined. No interval changes since H&P (within 30 days).         Mick Pedro MD

## 2023-02-27 NOTE — ANESTHESIA PROCEDURE NOTES
Airway       Patient location during procedure: OR       Procedure Start/Stop Times: 2/27/2023 7:47 AM  Staff -        Performed By: CRNAIndications and Patient Condition       Indications for airway management: alejandrina-procedural       Induction type:intravenous       Mask difficulty assessment: 1 - vent by mask    Final Airway Details       Final airway type: endotracheal airway       Successful airway: BALA and Oral  Endotracheal Airway Details        ETT size (mm): 7.5       Cuffed: yes       Successful intubation technique: direct laryngoscopy       DL Blade Type: Chapman 2       Grade View of Cords: 1       Adjucts: stylet    Post intubation assessment        Placement verified by: capnometry, equal breath sounds and chest rise        Number of attempts at approach: 1       Number of other approaches attempted: 0       Secured with: cloth tape       Ease of procedure: easy       Dentition: Intact       Dental guard used and removed.    Medication(s) Administered   Medication Administration Time: 2/27/2023 7:47 AM

## 2023-02-27 NOTE — ANESTHESIA POSTPROCEDURE EVALUATION
Patient: Boris Hartman    Procedure: Procedure(s):  SEPTOPLASTY; BILATERAL INFERIOR TURBINATE REDUCTION       Anesthesia Type:  General    Note:  Disposition: Outpatient   Postop Pain Control: Uneventful            Sign Out: Well controlled pain   PONV: No   Neuro/Psych: Uneventful            Sign Out: Acceptable/Baseline neuro status   Airway/Respiratory: Uneventful            Sign Out: Acceptable/Baseline resp. status   CV/Hemodynamics: Uneventful            Sign Out: Acceptable CV status; No obvious hypovolemia; No obvious fluid overload   Other NRE:    DID A NON-ROUTINE EVENT OCCUR? No           Last vitals:  Vitals Value Taken Time   /74 02/27/23 0930   Temp 98.2  F (36.8  C) 02/27/23 0930   Pulse 73 02/27/23 0933   Resp 8 02/27/23 0933   SpO2 100 % 02/27/23 0933   Vitals shown include unvalidated device data.    Electronically Signed By: Mick Pedro MD  February 27, 2023  11:12 AM

## 2023-02-28 ENCOUNTER — TELEPHONE (OUTPATIENT)
Dept: OTOLARYNGOLOGY | Facility: CLINIC | Age: 28
End: 2023-02-28
Payer: COMMERCIAL

## 2023-02-28 DIAGNOSIS — J34.2 NASAL SEPTAL DEVIATION: ICD-10-CM

## 2023-02-28 DIAGNOSIS — J34.89 NASAL OBSTRUCTION: ICD-10-CM

## 2023-02-28 NOTE — TELEPHONE ENCOUNTER
Called pharmacy and it was covered and filled per pharmacist.     Tiki ESTRADA RN, [facility] 02/28/23 2:26 PM

## 2023-02-28 NOTE — TELEPHONE ENCOUNTER
Drug change request- Hydrocodone/Acetaminophen not covered by patient plan.  Please call/fax the pharmacy to change medication along with strength, directions, quantity, and refills

## 2023-03-05 NOTE — PROGRESS NOTES
History of Present Illness - Boris Hartman is a 27 year old male who is status post septoplasty and inferior turbinate reduction on 2/27/23.  They had some bleeding for a few days, but that has stopped. They have congestion. Pain has been easily controlled. No evidence of infection.     Exam -   /79   Pulse 74   Resp 16   SpO2 97%   General - The patient is in no distress.  Alert and oriented to person and place, answers questions and cooperates with examination appropriately.   Eyes - Extraocular movements intact.  Sclera were not icteric or injected, conjunctiva were pink and moist.  Nose - After removing the splints and debris with a suction, everything looks great.  The incision is well healed and the turbinates are well lateralized. The airway is wide open bilaterally. No sign of synechiae or infection. No hematoma or septal perforation.    A/P - Boris Hartman has had a nice result from septoplasty and inferior turbinate reduction.  The position of the septum and nasal tip look good.  I will see the patient in one month for another follow up if needed.  I cautioned them to avoid any trauma to the nose, hard blowing, or twisting. They can gently irrigate with nasal saline 1-2 times daily, use nasal saline spray, and apply vaseline to the anterior septum as needed.

## 2023-03-07 ENCOUNTER — OFFICE VISIT (OUTPATIENT)
Dept: OTOLARYNGOLOGY | Facility: CLINIC | Age: 28
End: 2023-03-07
Payer: COMMERCIAL

## 2023-03-07 VITALS
RESPIRATION RATE: 16 BRPM | SYSTOLIC BLOOD PRESSURE: 127 MMHG | OXYGEN SATURATION: 97 % | DIASTOLIC BLOOD PRESSURE: 79 MMHG | HEART RATE: 74 BPM

## 2023-03-07 DIAGNOSIS — Z98.890 S/P NASAL SEPTOPLASTY: Primary | ICD-10-CM

## 2023-03-07 PROCEDURE — 99024 POSTOP FOLLOW-UP VISIT: CPT | Performed by: OTOLARYNGOLOGY

## 2023-04-17 ENCOUNTER — OFFICE VISIT (OUTPATIENT)
Dept: FAMILY MEDICINE | Facility: CLINIC | Age: 28
End: 2023-04-17
Payer: COMMERCIAL

## 2023-04-17 VITALS
BODY MASS INDEX: 18.62 KG/M2 | WEIGHT: 133 LBS | TEMPERATURE: 97.7 F | DIASTOLIC BLOOD PRESSURE: 69 MMHG | OXYGEN SATURATION: 99 % | HEIGHT: 71 IN | RESPIRATION RATE: 16 BRPM | SYSTOLIC BLOOD PRESSURE: 112 MMHG | HEART RATE: 75 BPM

## 2023-04-17 DIAGNOSIS — F90.0 ADHD (ATTENTION DEFICIT HYPERACTIVITY DISORDER), INATTENTIVE TYPE: Primary | ICD-10-CM

## 2023-04-17 PROCEDURE — 96127 BRIEF EMOTIONAL/BEHAV ASSMT: CPT | Performed by: FAMILY MEDICINE

## 2023-04-17 PROCEDURE — 99214 OFFICE O/P EST MOD 30 MIN: CPT | Performed by: FAMILY MEDICINE

## 2023-04-17 RX ORDER — LISDEXAMFETAMINE DIMESYLATE 40 MG/1
40 CAPSULE ORAL EVERY MORNING
Qty: 30 CAPSULE | Refills: 0 | Status: SHIPPED | OUTPATIENT
Start: 2023-04-17 | End: 2023-05-23

## 2023-04-17 ASSESSMENT — ANXIETY QUESTIONNAIRES
7. FEELING AFRAID AS IF SOMETHING AWFUL MIGHT HAPPEN: NOT AT ALL
7. FEELING AFRAID AS IF SOMETHING AWFUL MIGHT HAPPEN: NOT AT ALL
8. IF YOU CHECKED OFF ANY PROBLEMS, HOW DIFFICULT HAVE THESE MADE IT FOR YOU TO DO YOUR WORK, TAKE CARE OF THINGS AT HOME, OR GET ALONG WITH OTHER PEOPLE?: SOMEWHAT DIFFICULT
2. NOT BEING ABLE TO STOP OR CONTROL WORRYING: SEVERAL DAYS
GAD7 TOTAL SCORE: 8
GAD7 TOTAL SCORE: 8
1. FEELING NERVOUS, ANXIOUS, OR ON EDGE: SEVERAL DAYS
3. WORRYING TOO MUCH ABOUT DIFFERENT THINGS: NEARLY EVERY DAY
5. BEING SO RESTLESS THAT IT IS HARD TO SIT STILL: NOT AT ALL
6. BECOMING EASILY ANNOYED OR IRRITABLE: MORE THAN HALF THE DAYS
4. TROUBLE RELAXING: SEVERAL DAYS
GAD7 TOTAL SCORE: 8
IF YOU CHECKED OFF ANY PROBLEMS ON THIS QUESTIONNAIRE, HOW DIFFICULT HAVE THESE PROBLEMS MADE IT FOR YOU TO DO YOUR WORK, TAKE CARE OF THINGS AT HOME, OR GET ALONG WITH OTHER PEOPLE: SOMEWHAT DIFFICULT

## 2023-04-17 ASSESSMENT — PATIENT HEALTH QUESTIONNAIRE - PHQ9
SUM OF ALL RESPONSES TO PHQ QUESTIONS 1-9: 11
SUM OF ALL RESPONSES TO PHQ QUESTIONS 1-9: 11
10. IF YOU CHECKED OFF ANY PROBLEMS, HOW DIFFICULT HAVE THESE PROBLEMS MADE IT FOR YOU TO DO YOUR WORK, TAKE CARE OF THINGS AT HOME, OR GET ALONG WITH OTHER PEOPLE: SOMEWHAT DIFFICULT

## 2023-04-17 ASSESSMENT — PAIN SCALES - GENERAL: PAINLEVEL: NO PAIN (0)

## 2023-04-17 NOTE — PROGRESS NOTES
"  Assessment & Plan     ADHD (attention deficit hyperactivity disorder), inattentive type  We discussed treatment options and decided to increase the Vyvanse dose to 40 mg daily since he has only noticed minimal benefit since starting the 20 mg dose.  Potential side effects were discussed and he will let me know if he is not tolerating this.  I recommended he schedule follow-up virtual visit in 1 month or sooner if needed.  - lisdexamfetamine (VYVANSE) 40 MG capsule; Take 1 capsule (40 mg) by mouth every morning             Depression Screening Follow Up        4/17/2023     3:05 PM   PHQ   PHQ-9 Total Score 11   Q9: Thoughts of better off dead/self-harm past 2 weeks Not at all         Follow Up Actions Taken    Anthony Meng [facility]    Gary Escalante is a 27 year old, presenting for the following health issues:  Recheck Medication        4/17/2023     3:09 PM   Additional Questions   Roomed by mechelle DOMINGUEZ           He has a history significant for ADHD inattentive type.  We recently restarted medication, Vyvanse 20 mg daily.  He is tolerating this well.  He reports that it initially seemed to provide some benefit when he first started taking it, but he admits that there is still a lot of room for improvement.  Appetite, sleep and mood have been fine.    Review of Systems   Constitutional, HEENT, cardiovascular, pulmonary, GI, , musculoskeletal, neuro, skin, endocrine and psych systems are negative, except as otherwise noted.      Objective    /69   Pulse 75   Temp 97.7  F (36.5  C) (Temporal)   Resp 16   Ht 1.803 m (5' 11\")   Wt 60.3 kg (133 lb)   SpO2 99%   BMI 18.55 kg/m    Body mass index is 18.55 kg/m .  Physical Exam   GENERAL: healthy, alert and no distress  EYES: Eyes grossly normal to inspection, PERRL and conjunctivae and sclerae normal  NECK: no adenopathy, no asymmetry, masses, or scars and thyroid normal to palpation  RESP: lungs clear to " auscultation - no rales, rhonchi or wheezes  CV: regular rate and rhythm, normal S1 S2, no S3 or S4, no murmur, click or rub, no peripheral edema and peripheral pulses strong  MS: no gross musculoskeletal defects noted, no edema  SKIN: no suspicious lesions or rashes  NEURO: Normal strength and tone, mentation intact and speech normal  PSYCH: mentation appears normal, affect normal/bright

## 2023-05-02 ENCOUNTER — TELEPHONE (OUTPATIENT)
Dept: FAMILY MEDICINE | Facility: CLINIC | Age: 28
End: 2023-05-02
Payer: COMMERCIAL

## 2023-05-02 NOTE — TELEPHONE ENCOUNTER
"DE,  Patient calling with concern for new medication prescription.  Took first dose of vyvanse 40 mg on Friday.  Had \"weird\" thing happen- jaw kept going to the side  Bottom jaw would just move to the side, kind of like a twitch  Stopped around the time that the patient assumes the medication wore off  Also felt generally uneasy without a reason.  Haven't taking medication since Friday, which was when he had these symptoms.   Please advise.  Waleska GOODSON RN    "

## 2023-05-03 NOTE — TELEPHONE ENCOUNTER
Please contact this patient and let him know that her his symptoms are probably because the Vyvanse dose is too high.  I recommend that we decrease the dose, either back to 20 mg daily or try 30 mg daily.  Please ask him which dose he would prefer me to send to his pharmacy.  Thank you, DE

## 2023-05-05 NOTE — TELEPHONE ENCOUNTER
Attempt #2 to reach patient.  Left non detailed VM for pt asking that they callback to discuss.  Waleska GOODSON RN

## 2023-05-10 ENCOUNTER — OFFICE VISIT (OUTPATIENT)
Dept: FAMILY MEDICINE | Facility: CLINIC | Age: 28
End: 2023-05-10
Payer: COMMERCIAL

## 2023-05-10 VITALS
TEMPERATURE: 99.1 F | OXYGEN SATURATION: 97 % | HEIGHT: 71 IN | SYSTOLIC BLOOD PRESSURE: 113 MMHG | WEIGHT: 131 LBS | RESPIRATION RATE: 16 BRPM | BODY MASS INDEX: 18.34 KG/M2 | HEART RATE: 78 BPM | DIASTOLIC BLOOD PRESSURE: 63 MMHG

## 2023-05-10 DIAGNOSIS — L42 PITYRIASIS ROSEA: Primary | ICD-10-CM

## 2023-05-10 PROCEDURE — 99213 OFFICE O/P EST LOW 20 MIN: CPT | Performed by: FAMILY MEDICINE

## 2023-05-10 ASSESSMENT — PAIN SCALES - GENERAL: PAINLEVEL: NO PAIN (0)

## 2023-05-10 NOTE — PROGRESS NOTES
Assessment & Plan     Pityriasis rosea  Discussed signs and symptoms and course of this.  Reviewed that it may take 4 to 8 weeks for this to fully clear discussed this is not contagious but likely was contagious initially.  Patient did have some nonspecific viral symptoms a week ago which would explain this.  Reviewed possibility of trying over-the-counter topical steroids calamine lotion and Benadryl if it is itchy and if any areas become significantly red or streaky that would be a sign of an infection and not to reason to be seen      Prescription drug management  15 minutes spent by me on the date of the encounter doing chart review, history and exam, documentation and further activities per the note        See Patient Instructions    Gila Fiore MD  [facility]    Gary Escalante is a 27 year old, presenting for the following health issues:  Rash        5/10/2023     4:22 PM   Additional Questions   Roomed by Robyn MILLARD     Rash  Associated symptoms include a rash.        Rash  Onset/Duration: 4 days started with an insect bite awoke with a red bump  No fevers  Little itchy not too bad  No pets  Description  Location: right groin up to inner right hip  Character: blotchy, raised, red  Itching: mild  Intensity:  mild  Progression of Symptoms:  worsening  Accompanying signs and symptoms:   Fever: No  Body aches or joint pain: No  Sore throat symptoms: No  Recent cold symptoms: No  History:           Previous episodes of similar rash: similar rash   New exposures:  Thinks may spider or bug bite   Recent travel: No  Exposure to similar rash: No  Precipitating or alleviating factors: none  Therapies tried and outcome: none          Review of Systems   Skin: Positive for rash.      Constitutional, HEENT, cardiovascular, pulmonary, gi and gu systems are negative, except as otherwise noted.      Objective    /63 (BP Location: Right arm, Patient Position: Sitting, Cuff Size: Adult  "Regular)   Pulse 78   Temp 99.1  F (37.3  C) (Temporal)   Resp 16   Ht 1.797 m (5' 10.75\")   Wt 59.4 kg (131 lb)   SpO2 97%   BMI 18.40 kg/m    Body mass index is 18.4 kg/m .  Physical Exam   GENERAL: healthy, alert and no distress  SKIN: Patient has a raised maculopapular rash with pinkish colored papules on the skin.  He has a large herald patch on his lower abdomen and the rash that spreads along inner groin and around to his back covering his whole back in a Karthikeyan tree like distribution.  No lesions on palms or soles                    "

## 2023-05-23 ENCOUNTER — VIRTUAL VISIT (OUTPATIENT)
Dept: FAMILY MEDICINE | Facility: CLINIC | Age: 28
End: 2023-05-23
Payer: COMMERCIAL

## 2023-05-23 DIAGNOSIS — F90.0 ADHD (ATTENTION DEFICIT HYPERACTIVITY DISORDER), INATTENTIVE TYPE: Primary | ICD-10-CM

## 2023-05-23 PROCEDURE — 99213 OFFICE O/P EST LOW 20 MIN: CPT | Mod: VID | Performed by: FAMILY MEDICINE

## 2023-05-23 RX ORDER — LISDEXAMFETAMINE DIMESYLATE 30 MG/1
30 CAPSULE ORAL DAILY
Qty: 30 CAPSULE | Refills: 0 | Status: SHIPPED | OUTPATIENT
Start: 2023-05-23 | End: 2023-06-22

## 2023-05-23 RX ORDER — LISDEXAMFETAMINE DIMESYLATE 30 MG/1
30 CAPSULE ORAL DAILY
Qty: 30 CAPSULE | Refills: 0 | Status: SHIPPED | OUTPATIENT
Start: 2023-06-23 | End: 2023-07-23

## 2023-05-23 RX ORDER — LISDEXAMFETAMINE DIMESYLATE 30 MG/1
30 CAPSULE ORAL DAILY
Qty: 30 CAPSULE | Refills: 0 | Status: SHIPPED | OUTPATIENT
Start: 2023-07-24 | End: 2023-08-23

## 2023-05-23 ASSESSMENT — ANXIETY QUESTIONNAIRES
IF YOU CHECKED OFF ANY PROBLEMS ON THIS QUESTIONNAIRE, HOW DIFFICULT HAVE THESE PROBLEMS MADE IT FOR YOU TO DO YOUR WORK, TAKE CARE OF THINGS AT HOME, OR GET ALONG WITH OTHER PEOPLE: SOMEWHAT DIFFICULT
5. BEING SO RESTLESS THAT IT IS HARD TO SIT STILL: SEVERAL DAYS
2. NOT BEING ABLE TO STOP OR CONTROL WORRYING: NOT AT ALL
7. FEELING AFRAID AS IF SOMETHING AWFUL MIGHT HAPPEN: NOT AT ALL
4. TROUBLE RELAXING: NOT AT ALL
3. WORRYING TOO MUCH ABOUT DIFFERENT THINGS: NOT AT ALL
8. IF YOU CHECKED OFF ANY PROBLEMS, HOW DIFFICULT HAVE THESE MADE IT FOR YOU TO DO YOUR WORK, TAKE CARE OF THINGS AT HOME, OR GET ALONG WITH OTHER PEOPLE?: SOMEWHAT DIFFICULT
GAD7 TOTAL SCORE: 3
1. FEELING NERVOUS, ANXIOUS, OR ON EDGE: SEVERAL DAYS
7. FEELING AFRAID AS IF SOMETHING AWFUL MIGHT HAPPEN: NOT AT ALL
GAD7 TOTAL SCORE: 3
GAD7 TOTAL SCORE: 3
6. BECOMING EASILY ANNOYED OR IRRITABLE: SEVERAL DAYS

## 2023-05-23 ASSESSMENT — PATIENT HEALTH QUESTIONNAIRE - PHQ9
SUM OF ALL RESPONSES TO PHQ QUESTIONS 1-9: 6
SUM OF ALL RESPONSES TO PHQ QUESTIONS 1-9: 6
10. IF YOU CHECKED OFF ANY PROBLEMS, HOW DIFFICULT HAVE THESE PROBLEMS MADE IT FOR YOU TO DO YOUR WORK, TAKE CARE OF THINGS AT HOME, OR GET ALONG WITH OTHER PEOPLE: SOMEWHAT DIFFICULT

## 2023-05-23 NOTE — PROGRESS NOTES
Boris is a 27 year old who is being evaluated via a billable video visit.      How would you like to obtain your AVS? MyChart  If the video visit is dropped, the invitation should be resent by: Text to cell phone: [phone]  Will anyone else be joining your video visit? No          Assessment & Plan     ADHD (attention deficit hyperactivity disorder), inattentive type  We decided to decrease the Vyvanse dose to 30 mg daily since the 20 mg dose seem too low and the 40 mg dose is too high.  He will let me know how things are going if he is having unwanted side effects.  Otherwise, he may follow-up in 3 months for his next scheduled ADHD follow-up appointment.  - lisdexamfetamine (VYVANSE) 30 MG capsule; Take 1 capsule (30 mg) by mouth daily for 30 days  - lisdexamfetamine (VYVANSE) 30 MG capsule; Take 1 capsule (30 mg) by mouth daily for 30 days  - lisdexamfetamine (VYVANSE) 30 MG capsule; Take 1 capsule (30 mg) by mouth daily for 30 days                 Anthony Meng, [facility]   Boris is a 27 year old, presenting for the following health issues:  Recheck Medication        5/23/2023    11:45 AM   Additional Questions   Roomed by Domo DOMINGUEZ           He has a history of ADHD inattentive type.  Last month we tried increasing the Vyvanse from 20 mg daily to 40 mg daily.  He took 1 dose and felt like he did not tolerate it well.  He noticed some jaw twitching and thinks that the dose was too high.  What is interesting is that when he tried doing 2 of the 20 mg doses he did not have the side effects.  He is interested in continuing the Vyvanse, but thinks a lower dose might be better for him.  He denies unwanted side effects when he was on the 20 mg dose of Vyvanse.  Appetite, sleep and mood have been good.      Review of Systems   Constitutional, HEENT, cardiovascular, pulmonary, GI, , musculoskeletal, neuro, skin, endocrine and psych systems are negative, except as  otherwise noted.      Objective    Vitals - Patient Reported  Pain Score: No Pain (0)      Vitals:  No vitals were obtained today due to virtual visit.    Physical Exam   GENERAL: Healthy, alert and no distress  EYES: Eyes grossly normal to inspection.  No discharge or erythema, or obvious scleral/conjunctival abnormalities.  RESP: No audible wheeze, cough, or visible cyanosis.  No visible retractions or increased work of breathing.    SKIN: Visible skin clear. No significant rash, abnormal pigmentation or lesions.  NEURO: Cranial nerves grossly intact.  Mentation and speech appropriate for age.  PSYCH: Mentation appears normal, affect normal/bright, judgement and insight intact, normal speech and appearance well-groomed.                Video-Visit Details    Type of service:  Video Visit     Originating Location (pt. Location): Home    Distant Location (provider location):  On-site  Platform used for Video Visit: Irving

## 2023-10-14 ENCOUNTER — HEALTH MAINTENANCE LETTER (OUTPATIENT)
Age: 28
End: 2023-10-14

## 2024-05-23 ENCOUNTER — OFFICE VISIT (OUTPATIENT)
Dept: FAMILY MEDICINE | Facility: CLINIC | Age: 29
End: 2024-05-23
Payer: COMMERCIAL

## 2024-05-23 VITALS
OXYGEN SATURATION: 99 % | BODY MASS INDEX: 18.83 KG/M2 | HEIGHT: 71 IN | TEMPERATURE: 99.3 F | HEART RATE: 73 BPM | SYSTOLIC BLOOD PRESSURE: 127 MMHG | RESPIRATION RATE: 14 BRPM | DIASTOLIC BLOOD PRESSURE: 73 MMHG | WEIGHT: 134.5 LBS

## 2024-05-23 DIAGNOSIS — F90.0 ADHD (ATTENTION DEFICIT HYPERACTIVITY DISORDER), INATTENTIVE TYPE: Primary | ICD-10-CM

## 2024-05-23 DIAGNOSIS — F41.9 ANXIETY: ICD-10-CM

## 2024-05-23 PROCEDURE — 96127 BRIEF EMOTIONAL/BEHAV ASSMT: CPT | Performed by: FAMILY MEDICINE

## 2024-05-23 PROCEDURE — 99214 OFFICE O/P EST MOD 30 MIN: CPT | Mod: 25 | Performed by: FAMILY MEDICINE

## 2024-05-23 PROCEDURE — 90471 IMMUNIZATION ADMIN: CPT | Performed by: FAMILY MEDICINE

## 2024-05-23 PROCEDURE — 90651 9VHPV VACCINE 2/3 DOSE IM: CPT | Performed by: FAMILY MEDICINE

## 2024-05-23 RX ORDER — METHYLPHENIDATE HYDROCHLORIDE 18 MG/1
18 TABLET ORAL EVERY MORNING
Qty: 30 TABLET | Refills: 0 | Status: SHIPPED | OUTPATIENT
Start: 2024-05-23 | End: 2024-09-23

## 2024-05-23 ASSESSMENT — PATIENT HEALTH QUESTIONNAIRE - PHQ9
SUM OF ALL RESPONSES TO PHQ QUESTIONS 1-9: 15
SUM OF ALL RESPONSES TO PHQ QUESTIONS 1-9: 15
10. IF YOU CHECKED OFF ANY PROBLEMS, HOW DIFFICULT HAVE THESE PROBLEMS MADE IT FOR YOU TO DO YOUR WORK, TAKE CARE OF THINGS AT HOME, OR GET ALONG WITH OTHER PEOPLE: VERY DIFFICULT

## 2024-05-23 ASSESSMENT — ANXIETY QUESTIONNAIRES
GAD7 TOTAL SCORE: 5
GAD7 TOTAL SCORE: 5
3. WORRYING TOO MUCH ABOUT DIFFERENT THINGS: MORE THAN HALF THE DAYS
5. BEING SO RESTLESS THAT IT IS HARD TO SIT STILL: SEVERAL DAYS
6. BECOMING EASILY ANNOYED OR IRRITABLE: SEVERAL DAYS
7. FEELING AFRAID AS IF SOMETHING AWFUL MIGHT HAPPEN: NOT AT ALL
1. FEELING NERVOUS, ANXIOUS, OR ON EDGE: SEVERAL DAYS
8. IF YOU CHECKED OFF ANY PROBLEMS, HOW DIFFICULT HAVE THESE MADE IT FOR YOU TO DO YOUR WORK, TAKE CARE OF THINGS AT HOME, OR GET ALONG WITH OTHER PEOPLE?: SOMEWHAT DIFFICULT
GAD7 TOTAL SCORE: 5
7. FEELING AFRAID AS IF SOMETHING AWFUL MIGHT HAPPEN: NOT AT ALL
IF YOU CHECKED OFF ANY PROBLEMS ON THIS QUESTIONNAIRE, HOW DIFFICULT HAVE THESE PROBLEMS MADE IT FOR YOU TO DO YOUR WORK, TAKE CARE OF THINGS AT HOME, OR GET ALONG WITH OTHER PEOPLE: SOMEWHAT DIFFICULT
4. TROUBLE RELAXING: NOT AT ALL
2. NOT BEING ABLE TO STOP OR CONTROL WORRYING: NOT AT ALL

## 2024-05-23 ASSESSMENT — PAIN SCALES - GENERAL: PAINLEVEL: NO PAIN (0)

## 2024-05-23 NOTE — PROGRESS NOTES
Assessment & Plan     ADHD (attention deficit hyperactivity disorder), inattentive type  We discussed treatment options and decided to try methylphenidate.  He reports taking this from eighth grade through high school and it was effective without significant unwanted side effects.  He cannot remember if he was on a short acting version of this or a long-acting version.  He remembers only taking it once daily though.  We decided to start Concerta 18 mg daily and he is going to schedule follow-up with me in 1 month or sooner if needed.  - methylphenidate HCl ER, OSM, (CONCERTA) 18 MG CR tablet; Take 1 tablet (18 mg) by mouth every morning    Anxiety  He feels like anxiety symptoms are stable without medication.      Depression Screening Follow Up        5/23/2024    10:39 AM   PHQ   PHQ-9 Total Score 15   Q9: Thoughts of better off dead/self-harm past 2 weeks Not at all         5/23/2024    10:39 AM   Last PHQ-9   1.  Little interest or pleasure in doing things 3   2.  Feeling down, depressed, or hopeless 1   3.  Trouble falling or staying asleep, or sleeping too much 2   4.  Feeling tired or having little energy 2   5.  Poor appetite or overeating 3   6.  Feeling bad about yourself 1   7.  Trouble concentrating 3   8.  Moving slowly or restless 0   Q9: Thoughts of better off dead/self-harm past 2 weeks 0   PHQ-9 Total Score 15         Follow Up Actions Taken          Gary   Boris is a 28 year old, presenting for the following health issues:  Recheck Medication and A.D.H.D        5/23/2024    10:53 AM   Additional Questions   Roomed by Domo HWANG     History of Present Illness       Reason for visit:  Medication check in    He eats 2-3 servings of fruits and vegetables daily.He consumes 0 sweetened beverage(s) daily.He exercises with enough effort to increase his heart rate 20 to 29 minutes per day.  He exercises with enough effort to increase his heart rate 3 or less days per week.      ADHD Follow-Up    Date of  "last ADHD office visit: 05/23/23  Status since last visit: Worse - Work issues as a result of ADHD. Sleep issues, and marijuana use.   Taking controlled (daily) medications as prescribed: No                       Parent/Patient Concerns with Medications: History of medications not being effective, and stopping taking as a result.    He has a history of ADHD inattentive type.  This was diagnosed when he was in second-third grade.  He did not start medication until he was in eighth grade.  He reports doing well from eighth grade through high school on methylphenidate.  This was stopped when he was having a spike in anxiety symptoms during his first year of college.  He was started on Lexapro and then eventually switched to sertraline, but neither of these medications were very helpful.  In 2019 he was switched to Vyvanse.  He reports that the Vyvanse was helpful for ADHD symptoms, but it was causing jaw twitching symptoms that he could not stop.  He has been off the medication now for several months and he has noticed worsening ADHD symptoms.  He would like to discuss other options.  He also has a history of anxiety which feels relatively stable at this time.    Social history: He is working at a [employer] in a [employer] from 8 AM-4 PM.                  Review of Systems  Constitutional, HEENT, cardiovascular, pulmonary, GI, , musculoskeletal, neuro, skin, endocrine and psych systems are negative, except as otherwise noted.      Objective    /73   Pulse 73   Temp 99.3  F (37.4  C) (Temporal)   Resp 14   Ht 1.81 m (5' 11.25\")   Wt 61 kg (134 lb 8 oz)   SpO2 99%   BMI 18.63 kg/m    Body mass index is 18.63 kg/m .  Physical Exam   GENERAL: alert and no distress  EYES: Eyes grossly normal to inspection and conjunctivae and sclerae normal  HENT:  nose and mouth without ulcers or lesions  NECK: no adenopathy, no asymmetry, masses, or scars  RESP: lungs clear to auscultation - no rales, rhonchi or " wheezes  CV: regular rate and rhythm, normal S1 S2, no S3 or S4, no murmur, click or rub, no peripheral edema  MS: no gross musculoskeletal defects noted, no edema  SKIN: no suspicious lesions or rashes  NEURO: Normal strength and tone, mentation intact and speech normal  PSYCH: mentation appears normal, affect normal/bright            Signed Electronically by: Anthony Meng, DO

## 2024-09-23 ENCOUNTER — VIRTUAL VISIT (OUTPATIENT)
Dept: FAMILY MEDICINE | Facility: CLINIC | Age: 29
End: 2024-09-23

## 2024-09-23 DIAGNOSIS — F90.0 ADHD (ATTENTION DEFICIT HYPERACTIVITY DISORDER), INATTENTIVE TYPE: Primary | ICD-10-CM

## 2024-09-23 DIAGNOSIS — F41.9 ANXIETY: ICD-10-CM

## 2024-09-23 PROCEDURE — 99441 PR PHYSICIAN TELEPHONE EVALUATION 5-10 MIN: CPT | Mod: 93 | Performed by: FAMILY MEDICINE

## 2024-09-23 RX ORDER — METHYLPHENIDATE HYDROCHLORIDE 27 MG/1
27 TABLET ORAL DAILY
Qty: 30 TABLET | Refills: 0 | Status: SHIPPED | OUTPATIENT
Start: 2024-09-23 | End: 2024-10-23

## 2024-09-23 RX ORDER — METHYLPHENIDATE HYDROCHLORIDE 27 MG/1
27 TABLET ORAL DAILY
Qty: 30 TABLET | Refills: 0 | Status: SHIPPED | OUTPATIENT
Start: 2024-11-22 | End: 2024-12-22

## 2024-09-23 RX ORDER — METHYLPHENIDATE HYDROCHLORIDE 27 MG/1
27 TABLET ORAL DAILY
Qty: 30 TABLET | Refills: 0 | Status: SHIPPED | OUTPATIENT
Start: 2024-10-23 | End: 2024-11-22

## 2024-09-23 NOTE — PROGRESS NOTES
Boris is a 29 year old who is being evaluated via a billable telephone visit.    What phone number would you like to be contacted at? [phone]   How would you like to obtain your AVS? Mail a copy  Originating Location (pt. Location): Home    Distant Location (provider location):  On-site    Assessment & Plan     ADHD (attention deficit hyperactivity disorder), inattentive type  We decided to increase the Concerta dose to 27 mg daily to see if it will have better effect for him without unwanted side effects.  He is going to schedule follow-up in 3 months or sooner if needed.  - methylphenidate HCL ER, OSM, (CONCERTA) 27 MG CR tablet; Take 1 tablet (27 mg) by mouth daily.  - methylphenidate HCL ER, OSM, (CONCERTA) 27 MG CR tablet; Take 1 tablet (27 mg) by mouth daily.  - methylphenidate HCL ER, OSM, (CONCERTA) 27 MG CR tablet; Take 1 tablet (27 mg) by mouth daily.    Anxiety  He feels like anxiety symptoms are stable without medication specifically targeting this.          Subjective   Boris is a 29 year old, presenting for the following health issues:  No chief complaint on file.      9/23/2024    11:05 AM   Additional Questions   Roomed by Domo DOMINGUEZ   ADHD Follow-Up    Date of last ADHD office visit: 05/23/24  Status since last visit: Improving and Stable  Taking controlled (daily) medications as prescribed: No  -  ran out                     Parent/Patient Concerns with Medications: None  ADHD Medication       Stimulants - Misc. Disp Start End     methylphenidate HCl ER, OSM, (CONCERTA) 18 MG CR tablet 30 tablet 5/23/2024 --    Sig - Route: Take 1 tablet (18 mg) by mouth every morning - Oral    Class: E-Prescribe    Earliest Fill Date: 5/23/2024          He has a history of ADHD inattentive type.  This was diagnosed when he was in second-third grade.  He did not start medication until he was in eighth grade.  He reports doing well from eighth grade through high school on methylphenidate.  This was stopped  when he was having a spike in anxiety symptoms during his first year of college.  He was started on Lexapro and then eventually switched to sertraline, but neither of these medications were very helpful.  In 2019 he was switched to Vyvanse.  He reports that the Vyvanse was helpful for ADHD symptoms, but it was causing jaw twitching symptoms that he could not stop.  A few months ago he was restarted on methylphenidate, Concerta at 18 mg daily.  He reports noticing positive benefits from this, but there is still room for improvement regarding focus, attention and distractibility.  He denies unwanted side effects.  Appetite, sleep and mood have been stable.     Social history: He is working at a [email or web address] in a [employer]            Review of Systems  Constitutional, HEENT, cardiovascular, pulmonary, GI, , musculoskeletal, neuro, skin, endocrine and psych systems are negative, except as otherwise noted.      Objective           Vitals:  No vitals were obtained today due to virtual visit.    Physical Exam   General: Alert and no distress //Respiratory: No audible wheeze, cough, or shortness of breath // Psychiatric:  Appropriate affect, tone, and pace of words            Phone call duration: 5 minutes  Signed Electronically by: Anthony Meng DO

## 2024-12-01 ENCOUNTER — HEALTH MAINTENANCE LETTER (OUTPATIENT)
Age: 29
End: 2024-12-01

## 2025-07-15 ENCOUNTER — VIRTUAL VISIT (OUTPATIENT)
Dept: FAMILY MEDICINE | Facility: CLINIC | Age: 30
End: 2025-07-15
Payer: COMMERCIAL

## 2025-07-15 DIAGNOSIS — F41.9 ANXIETY: ICD-10-CM

## 2025-07-15 DIAGNOSIS — F90.0 ADHD (ATTENTION DEFICIT HYPERACTIVITY DISORDER), INATTENTIVE TYPE: Primary | ICD-10-CM

## 2025-07-15 PROCEDURE — 98006 SYNCH AUDIO-VIDEO EST MOD 30: CPT | Performed by: FAMILY MEDICINE

## 2025-07-15 RX ORDER — METHYLPHENIDATE HYDROCHLORIDE 27 MG/1
27 TABLET ORAL EVERY MORNING
Qty: 30 TABLET | Refills: 0 | Status: SHIPPED | OUTPATIENT
Start: 2025-07-15

## 2025-07-15 RX ORDER — ESCITALOPRAM OXALATE 5 MG/1
5 TABLET ORAL DAILY
Qty: 30 TABLET | Refills: 1 | Status: SHIPPED | OUTPATIENT
Start: 2025-07-15

## 2025-07-15 ASSESSMENT — ANXIETY QUESTIONNAIRES
7. FEELING AFRAID AS IF SOMETHING AWFUL MIGHT HAPPEN: NOT AT ALL
8. IF YOU CHECKED OFF ANY PROBLEMS, HOW DIFFICULT HAVE THESE MADE IT FOR YOU TO DO YOUR WORK, TAKE CARE OF THINGS AT HOME, OR GET ALONG WITH OTHER PEOPLE?: VERY DIFFICULT
6. BECOMING EASILY ANNOYED OR IRRITABLE: MORE THAN HALF THE DAYS
GAD7 TOTAL SCORE: 12
3. WORRYING TOO MUCH ABOUT DIFFERENT THINGS: NEARLY EVERY DAY
1. FEELING NERVOUS, ANXIOUS, OR ON EDGE: MORE THAN HALF THE DAYS
IF YOU CHECKED OFF ANY PROBLEMS ON THIS QUESTIONNAIRE, HOW DIFFICULT HAVE THESE PROBLEMS MADE IT FOR YOU TO DO YOUR WORK, TAKE CARE OF THINGS AT HOME, OR GET ALONG WITH OTHER PEOPLE: VERY DIFFICULT
2. NOT BEING ABLE TO STOP OR CONTROL WORRYING: NEARLY EVERY DAY
GAD7 TOTAL SCORE: 12
GAD7 TOTAL SCORE: 12
7. FEELING AFRAID AS IF SOMETHING AWFUL MIGHT HAPPEN: NOT AT ALL
4. TROUBLE RELAXING: SEVERAL DAYS
5. BEING SO RESTLESS THAT IT IS HARD TO SIT STILL: SEVERAL DAYS

## 2025-07-15 ASSESSMENT — PATIENT HEALTH QUESTIONNAIRE - PHQ9
SUM OF ALL RESPONSES TO PHQ QUESTIONS 1-9: 12
SUM OF ALL RESPONSES TO PHQ QUESTIONS 1-9: 12
10. IF YOU CHECKED OFF ANY PROBLEMS, HOW DIFFICULT HAVE THESE PROBLEMS MADE IT FOR YOU TO DO YOUR WORK, TAKE CARE OF THINGS AT HOME, OR GET ALONG WITH OTHER PEOPLE: VERY DIFFICULT

## 2025-07-15 NOTE — PROGRESS NOTES
Boris is a 30 year old who is being evaluated via a billable video visit.    How would you like to obtain your AVS? MyChart  If the video visit is dropped, the invitation should be resent by: Send to e-mail at: [email or web address]  Will anyone else be joining your video visit? No      Assessment & Plan     ADHD (attention deficit hyperactivity disorder), inattentive type  He was given a refill of methylphenidate which has been helpful in controlling ADHD symptoms without unwanted side effects.  He is going to be seen for a follow-up visit/physical in 1 month.  - methylphenidate HCL ER, OSM, (CONCERTA) 27 MG CR tablet; Take 1 tablet (27 mg) by mouth every morning.    Anxiety  We discussed treatment options for the worsening anxiety symptoms and decided to start Lexapro 5 mg daily.  He is being scheduled for a follow-up visit/physical exam in 1 month.  - escitalopram (LEXAPRO) 5 MG tablet; Take 1 tablet (5 mg) by mouth daily.    The longitudinal plan of care for the diagnosis(es)/condition(s) as documented were addressed during this visit. Due to the added complexity in care, I will continue to support Boris in the subsequent management and with ongoing continuity of care.    Depression Screening Follow Up        7/15/2025    12:27 PM   PHQ   PHQ-9 Total Score 12    Q9: Thoughts of better off dead/self-harm past 2 weeks Not at all       Patient-reported         7/15/2025    12:27 PM   Last PHQ-9   1.  Little interest or pleasure in doing things 3   2.  Feeling down, depressed, or hopeless 1   3.  Trouble falling or staying asleep, or sleeping too much 1   4.  Feeling tired or having little energy 2   5.  Poor appetite or overeating 2   6.  Feeling bad about yourself 1   7.  Trouble concentrating 2   8.  Moving slowly or restless 0   Q9: Thoughts of better off dead/self-harm past 2 weeks 0   PHQ-9 Total Score 12        Patient-reported               Subjective   Boris is a 30 year old, presenting for the following  health issues:  No chief complaint on file.    History of Present Illness       Mental Health Follow-up:  Patient presents to follow-up on Depression & Anxiety.Patient's depression since last visit has been:  Medium  The patient is not having other symptoms associated with depression.  Patient's anxiety since last visit has been:  Bad  The patient is not having other symptoms associated with anxiety.  Any significant life events: other  Patient is feeling anxious or having panic attacks.  Patient has concerns about alcohol or drug use.    He eats 2-3 servings of fruits and vegetables daily.He consumes 0 sweetened beverage(s) daily.He exercises with enough effort to increase his heart rate 20 to 29 minutes per day.  He exercises with enough effort to increase his heart rate 4 days per week.               Boris is a 30-year-old gentleman with a medical history significant for ADHD inattentive type and anxiety.  He scheduled a virtual visit to discuss restarting Concerta.  He has been off this for about 5 months and has noticed worsening ADHD symptoms.  He has also been struggling with worsening anxiety symptoms over the past few months.  He has been on fluoxetine and citalopram in the past.      Review of Systems  Constitutional, HEENT, cardiovascular, pulmonary, GI, , musculoskeletal, neuro, skin, endocrine and psych systems are negative, except as otherwise noted.      Objective           Vitals:  No vitals were obtained today due to virtual visit.    Physical Exam   GENERAL: alert and no distress  EYES: Eyes grossly normal to inspection.  No discharge or erythema, or obvious scleral/conjunctival abnormalities.  RESP: No audible wheeze, cough, or visible cyanosis.    SKIN: Visible skin clear. No significant rash, abnormal pigmentation or lesions.  NEURO: Cranial nerves grossly intact.  Mentation and speech appropriate for age.  PSYCH: Appropriate affect, tone, and pace of words          Video-Visit Details    Type  of service:  Video Visit   Originating Location (pt. Location): Home    Distant Location (provider location):  On-site  Platform used for Video Visit: Irving  Signed Electronically by: Anthony Meng DO

## 2025-08-20 ENCOUNTER — OFFICE VISIT (OUTPATIENT)
Dept: FAMILY MEDICINE | Facility: CLINIC | Age: 30
End: 2025-08-20
Payer: COMMERCIAL

## 2025-08-20 VITALS
SYSTOLIC BLOOD PRESSURE: 121 MMHG | WEIGHT: 137.9 LBS | TEMPERATURE: 99.1 F | DIASTOLIC BLOOD PRESSURE: 73 MMHG | HEIGHT: 71 IN | BODY MASS INDEX: 19.31 KG/M2 | OXYGEN SATURATION: 99 % | HEART RATE: 92 BPM | RESPIRATION RATE: 16 BRPM

## 2025-08-20 DIAGNOSIS — Z11.59 NEED FOR HEPATITIS C SCREENING TEST: ICD-10-CM

## 2025-08-20 DIAGNOSIS — Z00.00 ROUTINE GENERAL MEDICAL EXAMINATION AT A HEALTH CARE FACILITY: Primary | ICD-10-CM

## 2025-08-20 DIAGNOSIS — Z13.1 SCREENING FOR DIABETES MELLITUS (DM): ICD-10-CM

## 2025-08-20 DIAGNOSIS — Z11.4 SCREENING FOR HIV (HUMAN IMMUNODEFICIENCY VIRUS): ICD-10-CM

## 2025-08-20 DIAGNOSIS — F90.0 ADHD (ATTENTION DEFICIT HYPERACTIVITY DISORDER), INATTENTIVE TYPE: ICD-10-CM

## 2025-08-20 DIAGNOSIS — F41.9 ANXIETY: ICD-10-CM

## 2025-08-20 DIAGNOSIS — Z13.6 SCREENING FOR CARDIOVASCULAR CONDITION: ICD-10-CM

## 2025-08-20 PROCEDURE — 99214 OFFICE O/P EST MOD 30 MIN: CPT | Mod: 25 | Performed by: FAMILY MEDICINE

## 2025-08-20 PROCEDURE — 99395 PREV VISIT EST AGE 18-39: CPT | Mod: 25 | Performed by: FAMILY MEDICINE

## 2025-08-20 PROCEDURE — G2211 COMPLEX E/M VISIT ADD ON: HCPCS | Performed by: FAMILY MEDICINE

## 2025-08-20 PROCEDURE — 36415 COLL VENOUS BLD VENIPUNCTURE: CPT | Performed by: FAMILY MEDICINE

## 2025-08-20 PROCEDURE — 86803 HEPATITIS C AB TEST: CPT | Performed by: FAMILY MEDICINE

## 2025-08-20 PROCEDURE — 90471 IMMUNIZATION ADMIN: CPT | Performed by: FAMILY MEDICINE

## 2025-08-20 PROCEDURE — 80053 COMPREHEN METABOLIC PANEL: CPT | Performed by: FAMILY MEDICINE

## 2025-08-20 PROCEDURE — 87389 HIV-1 AG W/HIV-1&-2 AB AG IA: CPT | Performed by: FAMILY MEDICINE

## 2025-08-20 PROCEDURE — 80061 LIPID PANEL: CPT | Performed by: FAMILY MEDICINE

## 2025-08-20 PROCEDURE — 3078F DIAST BP <80 MM HG: CPT | Performed by: FAMILY MEDICINE

## 2025-08-20 PROCEDURE — 3074F SYST BP LT 130 MM HG: CPT | Performed by: FAMILY MEDICINE

## 2025-08-20 PROCEDURE — 96127 BRIEF EMOTIONAL/BEHAV ASSMT: CPT | Performed by: FAMILY MEDICINE

## 2025-08-20 PROCEDURE — 1126F AMNT PAIN NOTED NONE PRSNT: CPT | Performed by: FAMILY MEDICINE

## 2025-08-20 PROCEDURE — 90651 9VHPV VACCINE 2/3 DOSE IM: CPT | Performed by: FAMILY MEDICINE

## 2025-08-20 RX ORDER — METHYLPHENIDATE HYDROCHLORIDE 27 MG/1
27 TABLET ORAL DAILY
Qty: 30 TABLET | Refills: 0 | Status: SHIPPED | OUTPATIENT
Start: 2025-09-19 | End: 2025-10-19

## 2025-08-20 RX ORDER — ESCITALOPRAM OXALATE 10 MG/1
10 TABLET ORAL DAILY
Qty: 90 TABLET | Refills: 1 | Status: SHIPPED | OUTPATIENT
Start: 2025-09-01

## 2025-08-20 RX ORDER — METHYLPHENIDATE HYDROCHLORIDE 27 MG/1
27 TABLET ORAL DAILY
Qty: 30 TABLET | Refills: 0 | Status: SHIPPED | OUTPATIENT
Start: 2025-08-20 | End: 2025-09-19

## 2025-08-20 RX ORDER — METHYLPHENIDATE HYDROCHLORIDE 27 MG/1
27 TABLET ORAL DAILY
Qty: 30 TABLET | Refills: 0 | Status: SHIPPED | OUTPATIENT
Start: 2025-10-19 | End: 2025-11-18

## 2025-08-20 SDOH — HEALTH STABILITY: PHYSICAL HEALTH: ON AVERAGE, HOW MANY MINUTES DO YOU ENGAGE IN EXERCISE AT THIS LEVEL?: 30 MIN

## 2025-08-20 SDOH — HEALTH STABILITY: PHYSICAL HEALTH: ON AVERAGE, HOW MANY DAYS PER WEEK DO YOU ENGAGE IN MODERATE TO STRENUOUS EXERCISE (LIKE A BRISK WALK)?: 4 DAYS

## 2025-08-20 ASSESSMENT — ANXIETY QUESTIONNAIRES
IF YOU CHECKED OFF ANY PROBLEMS ON THIS QUESTIONNAIRE, HOW DIFFICULT HAVE THESE PROBLEMS MADE IT FOR YOU TO DO YOUR WORK, TAKE CARE OF THINGS AT HOME, OR GET ALONG WITH OTHER PEOPLE: SOMEWHAT DIFFICULT
7. FEELING AFRAID AS IF SOMETHING AWFUL MIGHT HAPPEN: NOT AT ALL
3. WORRYING TOO MUCH ABOUT DIFFERENT THINGS: MORE THAN HALF THE DAYS
7. FEELING AFRAID AS IF SOMETHING AWFUL MIGHT HAPPEN: NOT AT ALL
GAD7 TOTAL SCORE: 7
6. BECOMING EASILY ANNOYED OR IRRITABLE: NOT AT ALL
1. FEELING NERVOUS, ANXIOUS, OR ON EDGE: MORE THAN HALF THE DAYS
4. TROUBLE RELAXING: NOT AT ALL
GAD7 TOTAL SCORE: 7
GAD7 TOTAL SCORE: 7
2. NOT BEING ABLE TO STOP OR CONTROL WORRYING: MORE THAN HALF THE DAYS
5. BEING SO RESTLESS THAT IT IS HARD TO SIT STILL: SEVERAL DAYS
8. IF YOU CHECKED OFF ANY PROBLEMS, HOW DIFFICULT HAVE THESE MADE IT FOR YOU TO DO YOUR WORK, TAKE CARE OF THINGS AT HOME, OR GET ALONG WITH OTHER PEOPLE?: SOMEWHAT DIFFICULT

## 2025-08-20 ASSESSMENT — PAIN SCALES - GENERAL: PAINLEVEL_OUTOF10: NO PAIN (0)

## 2025-08-20 ASSESSMENT — SOCIAL DETERMINANTS OF HEALTH (SDOH): HOW OFTEN DO YOU GET TOGETHER WITH FRIENDS OR RELATIVES?: THREE TIMES A WEEK

## 2025-08-21 LAB
ALBUMIN SERPL BCG-MCNC: 5.1 G/DL (ref 3.5–5.2)
ALP SERPL-CCNC: 51 U/L (ref 40–150)
ALT SERPL W P-5'-P-CCNC: 29 U/L (ref 0–70)
ANION GAP SERPL CALCULATED.3IONS-SCNC: 14 MMOL/L (ref 7–15)
AST SERPL W P-5'-P-CCNC: 28 U/L (ref 0–45)
BILIRUB SERPL-MCNC: 0.8 MG/DL
BUN SERPL-MCNC: 17.3 MG/DL (ref 6–20)
CALCIUM SERPL-MCNC: 9.9 MG/DL (ref 8.8–10.4)
CHLORIDE SERPL-SCNC: 102 MMOL/L (ref 98–107)
CHOLEST SERPL-MCNC: 207 MG/DL
CREAT SERPL-MCNC: 0.95 MG/DL (ref 0.67–1.17)
EGFRCR SERPLBLD CKD-EPI 2021: >90 ML/MIN/1.73M2
FASTING STATUS PATIENT QL REPORTED: NO
FASTING STATUS PATIENT QL REPORTED: NO
GLUCOSE SERPL-MCNC: 91 MG/DL (ref 70–99)
HCO3 SERPL-SCNC: 26 MMOL/L (ref 22–29)
HCV AB SERPL QL IA: NONREACTIVE
HDLC SERPL-MCNC: 62 MG/DL
HIV 1+2 AB+HIV1 P24 AG SERPL QL IA: NONREACTIVE
LDLC SERPL CALC-MCNC: 129 MG/DL
NONHDLC SERPL-MCNC: 145 MG/DL
POTASSIUM SERPL-SCNC: 3.8 MMOL/L (ref 3.4–5.3)
PROT SERPL-MCNC: 7.3 G/DL (ref 6.4–8.3)
SODIUM SERPL-SCNC: 142 MMOL/L (ref 135–145)
TRIGL SERPL-MCNC: 78 MG/DL

## (undated) DEVICE — DRAPE SPLIT EENT 76X124" 3X28" 9447

## (undated) DEVICE — GLOVE PROTEXIS W/NEU-THERA 7.5  2D73TE75

## (undated) DEVICE — SU CHROMIC 5-0 P-3 18" 687G

## (undated) DEVICE — SU ETHILON 3-0 FS-1 18" 669H

## (undated) DEVICE — ESU SUCTION CAUTERY 10FR FOOT CONTROL E2505-10FR

## (undated) DEVICE — SYR EAR BULB 3OZ 0035830

## (undated) DEVICE — PACK MINOR SBA15MIFSE

## (undated) DEVICE — SPECIMEN CONTAINER 4OZ

## (undated) DEVICE — BLADE INFERIOR TURBINATE 2.9MMX11CM 1882940HR

## (undated) DEVICE — SUCTION CANISTER MEDIVAC LINER 1500ML W/LID 65651-515

## (undated) DEVICE — SYR 10ML FINGER CONTROL W/O NDL 309695

## (undated) DEVICE — SOL WATER IRRIG 1000ML BOTTLE 07139-09

## (undated) DEVICE — PREP POVIDONE IODINE SWABS X3

## (undated) DEVICE — SU CHROMIC 4-0 P-3 18" 1654G

## (undated) DEVICE — TUBING SUCTION 10'X3/16" N510

## (undated) DEVICE — SPONGE COTTONOID 1/2X3" 80-1407

## (undated) DEVICE — Device

## (undated) DEVICE — NDL 19GA 1.5"

## (undated) DEVICE — NDL SPINAL 25GA 3.5" QUINCKE 405180

## (undated) RX ORDER — ACETAMINOPHEN 325 MG/1
TABLET ORAL
Status: DISPENSED
Start: 2023-02-27

## (undated) RX ORDER — PROPOFOL 10 MG/ML
INJECTION, EMULSION INTRAVENOUS
Status: DISPENSED
Start: 2023-02-27

## (undated) RX ORDER — FENTANYL CITRATE 50 UG/ML
INJECTION, SOLUTION INTRAMUSCULAR; INTRAVENOUS
Status: DISPENSED
Start: 2023-02-27

## (undated) RX ORDER — CEFAZOLIN SODIUM 1 G/3ML
INJECTION, POWDER, FOR SOLUTION INTRAMUSCULAR; INTRAVENOUS
Status: DISPENSED
Start: 2023-02-27

## (undated) RX ORDER — GINSENG 100 MG
CAPSULE ORAL
Status: DISPENSED
Start: 2023-02-27

## (undated) RX ORDER — DEXAMETHASONE SODIUM PHOSPHATE 4 MG/ML
INJECTION, SOLUTION INTRA-ARTICULAR; INTRALESIONAL; INTRAMUSCULAR; INTRAVENOUS; SOFT TISSUE
Status: DISPENSED
Start: 2023-02-27

## (undated) RX ORDER — LIDOCAINE HYDROCHLORIDE AND EPINEPHRINE 10; 10 MG/ML; UG/ML
INJECTION, SOLUTION INFILTRATION; PERINEURAL
Status: DISPENSED
Start: 2023-02-27

## (undated) RX ORDER — COCAINE HYDROCHLORIDE 40 MG/ML
SOLUTION NASAL
Status: DISPENSED
Start: 2023-02-27

## (undated) RX ORDER — OXYCODONE HYDROCHLORIDE 5 MG/1
TABLET ORAL
Status: DISPENSED
Start: 2023-02-27